# Patient Record
Sex: FEMALE | Race: WHITE | NOT HISPANIC OR LATINO | Employment: OTHER | URBAN - METROPOLITAN AREA
[De-identification: names, ages, dates, MRNs, and addresses within clinical notes are randomized per-mention and may not be internally consistent; named-entity substitution may affect disease eponyms.]

---

## 2021-04-30 NOTE — PROGRESS NOTES
"Assessment & Plan   Problem List Items Addressed This Visit        Endocrine    Other hyperlipidemia    Relevant Medications    simvastatin (ZOCOR) 40 MG tablet       Circulatory    Essential hypertension - Primary    Relevant Medications    lisinopril (ZESTRIL) 40 MG tablet         1. Essential hypertension  Controlled, continue medications.    2. Other hyperlipidemia  Continue medications.           FUTURE APPOINTMENTS:       - Follow-up visit in 3-6 months    No follow-ups on file.    Shani Argueta MD  [facility]    Subjective     Nursing Notes:   Mariia Coreas, [facility]  5/5/2021 10:32 AM  Signed  Concha is here to establish care and recheck meds    Pre-visit Screening:  Immunizations:  up to date  Colonoscopy:  Declines  Mammogram: NA d/t age  Asthma Action Test/Plan:  NA  PHQ9:  None  GAD7:  None  Questioned patient about current smoking habits Pt. currently smokes.  Advised about smoking cessation.  Ok to leave detailed message on voice mail for today's visit only Yes, phone # [phone]           Concha De Jesus is a 75 year old female who presents to clinic today for the following health issues   HPI     Here today to followup on high blood pressure, high cholesterol. She is doing well, not due now for labs or medication refills. Establishing care.    Review of Systems   Constitutional, HEENT, cardiovascular, pulmonary, gi and gu systems are negative, except as otherwise noted.      Objective    /68 (BP Location: Right arm, Patient Position: Sitting, Cuff Size: Adult Large)   Pulse 63   Temp 98.4  F (36.9  C) (Oral)   Ht 1.486 m (4' 10.5\")   Wt 69.8 kg (153 lb 12.8 oz)   LMP  (LMP Unknown)   SpO2 98%   BMI 31.60 kg/m    Body mass index is 31.6 kg/m .  Physical Exam   GENERAL: healthy, alert and no distress  MS: no gross musculoskeletal defects noted, no edema  NEURO: Normal strength and tone, mentation intact and speech normal  PSYCH: mentation appears normal, affect " normal/bright

## 2021-05-05 ENCOUNTER — OFFICE VISIT (OUTPATIENT)
Dept: FAMILY MEDICINE | Facility: CLINIC | Age: 76
End: 2021-05-05

## 2021-05-05 VITALS
WEIGHT: 153.8 LBS | TEMPERATURE: 98.4 F | SYSTOLIC BLOOD PRESSURE: 134 MMHG | HEIGHT: 59 IN | OXYGEN SATURATION: 98 % | HEART RATE: 63 BPM | BODY MASS INDEX: 31 KG/M2 | DIASTOLIC BLOOD PRESSURE: 68 MMHG

## 2021-05-05 DIAGNOSIS — I10 ESSENTIAL HYPERTENSION: Primary | ICD-10-CM

## 2021-05-05 DIAGNOSIS — E78.49 OTHER HYPERLIPIDEMIA: ICD-10-CM

## 2021-05-05 PROBLEM — Z76.89 HEALTH CARE HOME: Status: ACTIVE | Noted: 2021-05-05

## 2021-05-05 PROBLEM — Z71.89 ACP (ADVANCE CARE PLANNING): Status: ACTIVE | Noted: 2021-05-05

## 2021-05-05 PROCEDURE — 99203 OFFICE O/P NEW LOW 30 MIN: CPT | Performed by: FAMILY MEDICINE

## 2021-05-05 RX ORDER — KETOCONAZOLE 20 MG/ML
SHAMPOO TOPICAL
COMMUNITY
Start: 2020-10-26 | End: 2023-02-15

## 2021-05-05 RX ORDER — LISINOPRIL 40 MG/1
TABLET ORAL
COMMUNITY
Start: 2020-10-26 | End: 2021-10-05

## 2021-05-05 RX ORDER — ACETAMINOPHEN 160 MG
TABLET,DISINTEGRATING ORAL
COMMUNITY

## 2021-05-05 RX ORDER — SIMVASTATIN 40 MG
TABLET ORAL
COMMUNITY
Start: 2020-10-26 | End: 2021-10-05

## 2021-05-05 RX ORDER — ASPIRIN 81 MG/1
81 TABLET ORAL DAILY
COMMUNITY

## 2021-05-05 SDOH — HEALTH STABILITY: MENTAL HEALTH: HOW OFTEN DO YOU HAVE A DRINK CONTAINING ALCOHOL?: NOT ASKED

## 2021-05-05 SDOH — HEALTH STABILITY: MENTAL HEALTH: HOW OFTEN DO YOU HAVE 6 OR MORE DRINKS ON ONE OCCASION?: NOT ASKED

## 2021-05-05 SDOH — HEALTH STABILITY: MENTAL HEALTH: HOW MANY STANDARD DRINKS CONTAINING ALCOHOL DO YOU HAVE ON A TYPICAL DAY?: NOT ASKED

## 2021-05-05 ASSESSMENT — MIFFLIN-ST. JEOR: SCORE: 1095.32

## 2021-05-05 NOTE — NURSING NOTE
Concha is here to establish care and recheck meds    Pre-visit Screening:  Immunizations:  up to date  Colonoscopy:  Declines  Mammogram: NA d/t age  Asthma Action Test/Plan:  NA  PHQ9:  None  GAD7:  None  Questioned patient about current smoking habits Pt. currently smokes.  Advised about smoking cessation.  Ok to leave detailed message on voice mail for today's visit only Yes, phone # [phone]

## 2021-05-25 ENCOUNTER — OFFICE VISIT (OUTPATIENT)
Dept: FAMILY MEDICINE | Facility: CLINIC | Age: 76
End: 2021-05-25

## 2021-05-25 VITALS
TEMPERATURE: 98.2 F | WEIGHT: 153 LBS | HEART RATE: 80 BPM | SYSTOLIC BLOOD PRESSURE: 136 MMHG | DIASTOLIC BLOOD PRESSURE: 70 MMHG | OXYGEN SATURATION: 97 % | BODY MASS INDEX: 31.43 KG/M2

## 2021-05-25 DIAGNOSIS — Z85.820 HISTORY OF MELANOMA: ICD-10-CM

## 2021-05-25 DIAGNOSIS — M25.562 LEFT KNEE PAIN, UNSPECIFIED CHRONICITY: Primary | ICD-10-CM

## 2021-05-25 PROCEDURE — 99213 OFFICE O/P EST LOW 20 MIN: CPT | Performed by: FAMILY MEDICINE

## 2021-05-25 NOTE — NURSING NOTE
Concha Pt wants MRI, left knee, swollen left ankle too, started to get really bad last week, originally started 3 months ago.    Pre-Visit Screening:  Immunizations:UTD  Colonoscopy:NA  Mammogram:declined  Asthma Action Test/Plan:NA  PHQ9:Na  GAD7:Na  Questioned patient about current smoking habits Pt.current smoker  OK to leave a detailed message on voice mail for today's visit yes, phone # [phone]

## 2021-05-25 NOTE — PROGRESS NOTES
"Assessment & Plan   Problem List Items Addressed This Visit        Other    History of melanoma    Relevant Orders    MR Knee Left w/o Contrast    RADIOLOGY REFERRAL      Other Visit Diagnoses     Left knee pain, unspecified chronicity    -  Primary    Relevant Orders    MR Knee Left w/o Contrast    RADIOLOGY REFERRAL         1. Left knee pain, unspecified chronicity  MRI knee then probable referral back to see Dr. Chung.  - MR Knee Left w/o Contrast  - RADIOLOGY REFERRAL    2. History of melanoma  Left lower leg  - MR Knee Left w/o Contrast  - RADIOLOGY REFERRAL           Tobacco Cessation:   reports that she has been smoking. She has never used smokeless tobacco.  Tobacco Cessation Action Plan: Information offered: Patient not interested at this time    BMI:   Estimated body mass index is 31.43 kg/m  as calculated from the following:    Height as of 5/5/21: 1.486 m (4' 10.5\").    Weight as of this encounter: 69.4 kg (153 lb).         FUTURE APPOINTMENTS:       - Follow-up visit per results.    No follow-ups on file.    Sahni Argueta MD  [facility]    Subjective     Nursing Notes:   Amy Callaway CMA  5/25/2021  3:02 PM  Signed  Concha Pt wants MRI, left knee, swollen left ankle too, started to get really bad last week, originally started 3 months ago.    Pre-Visit Screening:  Immunizations:UTD  Colonoscopy:NA  Mammogram:declined  Asthma Action Test/Plan:NA  PHQ9:Na  GAD7:Na  Questioned patient about current smoking habits Pt.current smoker  OK to leave a detailed message on voice mail for today's visit yes, phone # [phone]            Concha De Jesus is a 75 year old female who presents to clinic today for the following health issues   HPI     Left ankle swelling.  Has had trouble with the left knee for 2-3 months, has seen Dr. Chung and he did an xray, arthritis. And he did an injection and this lasted about a week. Also has swelling in the knee and it's tight/darlin horses, "   Follows with Dr. Fernandez dermatology for melanoma, goes in every 6 months. This was also in the posterior left knee.  Review of Systems   Constitutional, HEENT, cardiovascular, pulmonary, gi and gu systems are negative, except as otherwise noted.      Objective    /70 (BP Location: Right arm, Patient Position: Sitting, Cuff Size: Adult Large)   Pulse 80   Temp 98.2  F (36.8  C)   Wt 69.4 kg (153 lb)   LMP  (LMP Unknown)   SpO2 97%   BMI 31.43 kg/m    Body mass index is 31.43 kg/m .  Physical Exam   GENERAL: healthy, alert and no distress  MS: no gross musculoskeletal defects noted, no edema  NEURO: Normal strength and tone, mentation intact and speech normal  PSYCH: mentation appears normal, affect normal/bright  Left knee stable to varus/valgus stress, neg lachmans. Slightly bigger than right. But no obvious effusion. Normal rom

## 2021-05-25 NOTE — PATIENT INSTRUCTIONS
"Assessment & Plan   Problem List Items Addressed This Visit        Other    History of melanoma    Relevant Orders    MR Knee Left w/o Contrast    RADIOLOGY REFERRAL      Other Visit Diagnoses     Left knee pain, unspecified chronicity    -  Primary    Relevant Orders    MR Knee Left w/o Contrast    RADIOLOGY REFERRAL         1. Left knee pain, unspecified chronicity  MRI knee then probable referral back to see Dr. Chung.  - MR Knee Left w/o Contrast  - RADIOLOGY REFERRAL    2. History of melanoma  Left lower leg  - MR Knee Left w/o Contrast  - RADIOLOGY REFERRAL           Tobacco Cessation:   reports that she has been smoking. She has never used smokeless tobacco.  Tobacco Cessation Action Plan: Information offered: Patient not interested at this time    BMI:   Estimated body mass index is 31.43 kg/m  as calculated from the following:    Height as of 5/5/21: 1.486 m (4' 10.5\").    Weight as of this encounter: 69.4 kg (153 lb).         FUTURE APPOINTMENTS:       - Follow-up visit per results.    No follow-ups on file.    Shani Argueta MD  [facility]  "

## 2021-06-06 ENCOUNTER — HEALTH MAINTENANCE LETTER (OUTPATIENT)
Age: 76
End: 2021-06-06

## 2021-06-17 ENCOUNTER — TRANSFERRED RECORDS (OUTPATIENT)
Dept: FAMILY MEDICINE | Facility: CLINIC | Age: 76
End: 2021-06-17

## 2021-09-15 ENCOUNTER — OFFICE VISIT (OUTPATIENT)
Dept: FAMILY MEDICINE | Facility: CLINIC | Age: 76
End: 2021-09-15

## 2021-09-15 ENCOUNTER — MEDICAL CORRESPONDENCE (OUTPATIENT)
Dept: HEALTH INFORMATION MANAGEMENT | Facility: CLINIC | Age: 76
End: 2021-09-15

## 2021-09-15 ENCOUNTER — LAB (OUTPATIENT)
Dept: LAB | Facility: CLINIC | Age: 76
End: 2021-09-15
Payer: COMMERCIAL

## 2021-09-15 VITALS
SYSTOLIC BLOOD PRESSURE: 138 MMHG | HEART RATE: 63 BPM | OXYGEN SATURATION: 96 % | TEMPERATURE: 98.5 F | WEIGHT: 151.4 LBS | BODY MASS INDEX: 30.52 KG/M2 | DIASTOLIC BLOOD PRESSURE: 82 MMHG | HEIGHT: 59 IN

## 2021-09-15 DIAGNOSIS — R10.9 FLANK PAIN: Primary | ICD-10-CM

## 2021-09-15 DIAGNOSIS — N20.0 KIDNEY STONE: ICD-10-CM

## 2021-09-15 DIAGNOSIS — R01.1 HEART MURMUR: ICD-10-CM

## 2021-09-15 DIAGNOSIS — R79.89 ELEVATED D-DIMER: ICD-10-CM

## 2021-09-15 DIAGNOSIS — I25.10 CORONARY ARTERY DISEASE INVOLVING NATIVE CORONARY ARTERY OF NATIVE HEART WITHOUT ANGINA PECTORIS: ICD-10-CM

## 2021-09-15 LAB
APPEARANCE UR: CLEAR
BACTERIA, UR: ABNORMAL
BILIRUB UR QL: ABNORMAL
CASTS/LPF: ABNORMAL
COLOR UR: YELLOW
D DIMER PPP FEU-MCNC: 0.75 UG/ML FEU (ref 0–0.5)
EP/HPF: ABNORMAL
GLUCOSE URINE: ABNORMAL MG/DL
HGB UR QL: ABNORMAL
KETONES UR QL: ABNORMAL MG/DL
MISC.: ABNORMAL
NITRITE UR QL STRIP: ABNORMAL
PH UR STRIP: 6 PH (ref 5–7)
PROT UR QL: ABNORMAL MG/DL
RBC, UR MICRO: ABNORMAL (ref ?–2)
SP GR UR STRIP: 1.01 (ref 1–1.03)
UROBILINOGEN UR QL STRIP: 0.2 EU/DL (ref 0.2–1)
WBC #/AREA URNS HPF: ABNORMAL /[HPF]
WBC, UR MICRO: ABNORMAL (ref ?–2)

## 2021-09-15 PROCEDURE — 81001 URINALYSIS AUTO W/SCOPE: CPT | Performed by: FAMILY MEDICINE

## 2021-09-15 PROCEDURE — 36415 COLL VENOUS BLD VENIPUNCTURE: CPT

## 2021-09-15 PROCEDURE — 85379 FIBRIN DEGRADATION QUANT: CPT

## 2021-09-15 PROCEDURE — 71046 X-RAY EXAM CHEST 2 VIEWS: CPT | Performed by: FAMILY MEDICINE

## 2021-09-15 PROCEDURE — 99214 OFFICE O/P EST MOD 30 MIN: CPT | Performed by: FAMILY MEDICINE

## 2021-09-15 RX ORDER — TIZANIDINE 2 MG/1
2 TABLET ORAL 3 TIMES DAILY
Qty: 20 TABLET | Refills: 0 | Status: SHIPPED | OUTPATIENT
Start: 2021-09-15 | End: 2021-10-05

## 2021-09-15 ASSESSMENT — MIFFLIN-ST. JEOR: SCORE: 1079.44

## 2021-09-15 NOTE — PATIENT INSTRUCTIONS
Assessment & Plan   Problem List Items Addressed This Visit     None      Visit Diagnoses     Flank pain    -  Primary    Relevant Orders    URINALYSIS, ROUTINE ([facility]) (Completed)    URINE CULTURE AEROBIC BACTERIAL ([facility])    XR Chest 2 Views    Heart murmur        Relevant Orders    Echocardiogram Complete    Radiology Referral        1. Flank pain  Urine negative, chest xray done. I think this is more musculoskeletal.  Treat symptoms and followup if persistent. Will also do d dimer.  - URINALYSIS, ROUTINE ([facility])  - URINE CULTURE AEROBIC BACTERIAL ([facility])  - XR Chest 2 Views    2. Heart murmur  Referral done for echocardiogram.  - Echocardiogram Complete; Future  - Radiology Referral; Future                FUTURE APPOINTMENTS:       - Follow-up visit per results.    No follow-ups on file.    Shani Argueta MD  [facility]

## 2021-09-15 NOTE — NURSING NOTE
Chief Complaint   Patient presents with     Back Pain     back pain on right side mid- back started over the weekend, Monday pain had increased, steady pain, had this feeling last time she had pneumonia     Pre-visit Screening:  Immunizations:  up to date  Colonoscopy:  is up to date  Mammogram: is up to date  Asthma Action Test/Plan:  NA  PHQ9:  NA  GAD7:  NA  Questioned patient about current smoking habits Pt. smokes 1 pack of cigerettes a day  Ok to leave detailed message on voice mail for today's visit only Yes, phone # [phone]

## 2021-09-15 NOTE — PROGRESS NOTES
Assessment & Plan   Problem List Items Addressed This Visit        Circulatory    Coronary artery disease involving native coronary artery of native heart without angina pectoris    Relevant Orders    Adult Cardiology Eval Referral       Urinary    Kidney stone      Other Visit Diagnoses     Flank pain    -  Primary    Relevant Medications    tiZANidine (ZANAFLEX) 2 MG tablet    Other Relevant Orders    URINALYSIS, ROUTINE ([facility]) (Completed)    URINE CULTURE AEROBIC BACTERIAL ([facility])    XR Chest 2 Views (Completed)    CT Chest w Contrast (Completed)    Heart murmur        Relevant Orders    Echocardiogram Complete    Radiology Referral    Adult Cardiology Eval Referral    Elevated d-dimer        Relevant Orders    CT Chest w Contrast (Completed)    Radiology Referral (Completed)        1. Flank pain  Urine negative, chest xray done. I think this is more musculoskeletal.  Treat symptoms and followup if persistent. Will also do d dimer. Tizanidine as needed.  - URINALYSIS, ROUTINE ([facility])  - URINE CULTURE AEROBIC BACTERIAL ([facility])  - XR Chest 2 Views    2. Heart murmur  Referral done for echocardiogram.  - Echocardiogram Complete; Future  - Radiology Referral; Future       3. Elevated d-dimer  Negative CT scan.   - CT Chest w Contrast; Future  - Radiology Referral; Future  - Radiology Referral  - CT Chest w Contrast    4. Kidney stone  Referral to urology. However, I think this is less likely to cause her pain. However, I offered possibly doing renal ultrasound, suggested as a possibility by the radiologist. She declined for now.    5. Coronary artery disease involving native coronary artery of native heart without angina pectoris  Referral to cardiology.  - Adult Cardiology Eval Referral; Future           FUTURE APPOINTMENTS:       - Follow-up visit per results.    No follow-ups on file.    Shani Argueta MD  [facility]    Subjective     Nursing Notes:   Lo Munguia CMA  9/15/2021 10:33 AM   "Signed  Chief Complaint   Patient presents with     Back Pain     back pain on right side mid- back started over the weekend, Monday pain had increased, steady pain, had this feeling last time she had pneumonia     Pre-visit Screening:  Immunizations:  up to date  Colonoscopy:  is up to date  Mammogram: is up to date  Asthma Action Test/Plan:  NA  PHQ9:  NA  GAD7:  NA  Questioned patient about current smoking habits Pt. smokes 1 pack of cigerettes a day  Ok to leave detailed message on voice mail for today's visit only Yes, phone # [phone]           Concha De Jesus is a 76 year old female who presents to clinic today for the following health issues   HPI     Right flank pain, started Saturday, no fever and no shortness of breath. Is affecting golf, so then it's concerning. Thinks she has pneumonia again. Just in the one spot like last time. No urinary sx. No fevers. Certain movements bring it on, is almost constant.   No covid sx.    Review of Systems   Constitutional, HEENT, cardiovascular, pulmonary, gi and gu systems are negative, except as otherwise noted.      Objective    /82 (BP Location: Left arm, Patient Position: Sitting, Cuff Size: Adult Regular)   Pulse 63   Temp 98.5  F (36.9  C) (Temporal)   Ht 1.486 m (4' 10.5\")   Wt 68.7 kg (151 lb 6.4 oz)   LMP  (LMP Unknown)   SpO2 96%   BMI 31.10 kg/m    Body mass index is 31.1 kg/m .  Physical Exam   GENERAL: healthy, alert and no distress  RESP: lungs clear to auscultation - no rales, rhonchi or wheezes  CV: regular rate and rhythm, normal S1 S2, no S3 or S4, + murmur, click or rub, no peripheral edema and peripheral pulses strong  ABDOMEN: soft, nontender, no hepatosplenomegaly, no masses and bowel sounds normal  MS: no gross musculoskeletal defects noted, no edema  NEURO: Normal strength and tone, mentation intact and speech normal  PSYCH: mentation appears normal, affect normal/bright  No flank tenderness to palpation, normal back rom, she " can elicit the pain with certain twisting movement.    Results for orders placed or performed in visit on 09/15/21   D dimer quantitative     Status: Abnormal   Result Value Ref Range    D-Dimer Quantitative 0.75 (H) 0.00 - 0.50 ug/mL FEU    Narrative    This D-dimer assay is intended for use in conjunction with a clinical pretest probability assessment model to exclude pulmonary embolism (PE) and deep venous thrombosis (DVT) in outpatients suspected of PE or DVT. The cut-off value is 0.50 ug/mL FEU.   Results for orders placed or performed in visit on 09/15/21   XR Chest 2 Views     Status: None    Narrative    [facility]  Phone: [phone] * Fax: [phone] [address], [place] [address]  Age: 76 Y  Dept No.: [id number]  DAYO AGUILAR : 1945  Chart #  Gender: F  Req. Phys: Shani Argueta MD Clinic MRN:  Clinic: [facility] Acc#: [id number]  Exam: CHEST 2 VIEWS PA AND LATERAL Exam Date: 09/15/2021  EXAM: CHEST 2 VIEWS PA AND LATERAL  LOCATION: [facility]  DATE/TIME: 9/15/2021 1:46 PM  INDICATION: Flank pain.  COMPARISON: Chest x-ray 2016.  IMPRESSION: Negative chest.  Performed by: TIANA  Transcribed By: FELI on: 09/15/2021 2:07 PM CDT  Finalized By: ZOHRA PATLE M.D. on: 09/15/2021 3:09 PM CDT  Dictated By: ZOHRA PATEL M.D. Signed by: OTILIO  Page 1 of 1   CT Chest w Contrast     Status: None    Narrative    [address], [address]  [place] [address]  Phone: [phone]  [place]  : 1945 Req Phys: Shani Argueta MD  Clinic: [place] Patient name: DAYO AGUILAR [facility]  Dept No: [id number]  *CT CHEST w CONTRAST Exam Date: 09/15/2021 Accession: [id number]  EXAM: CT CHEST w CONTRAST  LOCATION: [facility] [place]  DATE/TIME: 9/15/2021 2:09 PM  INDICATION: Flank pain. Elevated D  Dimer.  COMPARISON: Outside chest CTA on 06/10/2019.  TECHNIQUE: CT chest with IV contrast. Multiplanar reformats were obtained. Dose reduction techniques were used.  CONTRAST: 80 mL Omnipaque 350.  FINDINGS:  LUNGS AND PLEURA: No pleural effusion or pneumothorax. Bibasilar pulmonary opacities, likely atelectasis.  MEDIASTINUM/AXILLAE: No evidence of pulmonary embolism. Mild cardiomegaly. No significant pericardial  effusion. No significant mediastinal, hilar or axillary lymphadenopathy. Small cystic area in the anterior aspect of the  aortopulmonary window (series 4 image 48), not significantly changed as compared to 06/10/2019 exam, indeterminate,  could represent bronchial or esophageal duplication cyst.  CORONARY ARTERY CALCIFICATION: Moderate.  UPPER ABDOMEN: Limited evaluation of the upper abdomen due to lack of coverage. 4 cm left upper pole renal cyst.  Few left kidney stones measure up to 6 mm at the upper pole. 9 mm left adrenal nodule (series 4 image 131).  MUSCULOSKELETAL: Multilevel degenerative changes of the spine. No suspicious osseous lesion.  IMPRESSION:  1. No evidence of pulmonary embolism.  2. Moderate atherosclerotic vascular calcification of the coronary arteries and proximal abdominal aorta.  3. Few left kidney stones measure up to 9 mm.  4. A 9 mm left adrenal nodule, not significantly changed as compared to 06/10/2019 exam, although technically  indeterminate, likely benign adenoma given its interval stability.  Dictated By: DANA PRESTON M.D.  Password protected electronic signature by: BESSIE  Trans: LUIS  Date Of Trans: 9/15/2021 2:33:00PM  Date report approved and signed by interpreting physician: 9/15/2021 2:49:00PM  Page 1 of 1   URINALYSIS, ROUTINE ([facility])     Status: Abnormal   Result Value Ref Range    Color Urine Yellow     Appearance Urine Clear     Glucose Urine Neg neg mg/dL    Bilirubin Urine Neg neg    Ketones Urine Neg neg mg/dL    Specific Gravity Urine 1.010 1.003 - 1.035     Blood Urine Neg neg    pH Urine 6.0 5.0 - 7.0 pH    Protein Urine neg neg - neg mg/dL    Urobilinogen Urine 0.2 0.2 - 1.0 EU/dL    Nitrite Urine Neg NEG    Leukocytes neg     Wbc, Urine Micro 0-2 neg - 2    RBC Micro Urine 0-2 neg - 2    EP/HPF FEW     Bacteria Urine FEW (A) neg - neg    Casts/LPF neg     Miscellaneous neg        CXR negative, personally read by me, sent for overread   Statement Selected

## 2021-09-17 LAB — URINE VOIDED CULTURE: NORMAL

## 2021-09-26 ENCOUNTER — HEALTH MAINTENANCE LETTER (OUTPATIENT)
Age: 76
End: 2021-09-26

## 2021-09-29 DIAGNOSIS — R01.1 HEART MURMUR: ICD-10-CM

## 2021-10-01 ENCOUNTER — TRANSFERRED RECORDS (OUTPATIENT)
Dept: FAMILY MEDICINE | Facility: CLINIC | Age: 76
End: 2021-10-01

## 2021-10-05 ENCOUNTER — OFFICE VISIT (OUTPATIENT)
Dept: FAMILY MEDICINE | Facility: CLINIC | Age: 76
End: 2021-10-05

## 2021-10-05 VITALS
WEIGHT: 149 LBS | TEMPERATURE: 97.5 F | DIASTOLIC BLOOD PRESSURE: 78 MMHG | BODY MASS INDEX: 30.04 KG/M2 | OXYGEN SATURATION: 97 % | HEART RATE: 57 BPM | HEIGHT: 59 IN | SYSTOLIC BLOOD PRESSURE: 126 MMHG

## 2021-10-05 DIAGNOSIS — Z82.49 FAMILY HISTORY OF ISCHEMIC HEART DISEASE: ICD-10-CM

## 2021-10-05 DIAGNOSIS — Z11.59 NEED FOR HEPATITIS C SCREENING TEST: ICD-10-CM

## 2021-10-05 DIAGNOSIS — R01.1 HEART MURMUR: ICD-10-CM

## 2021-10-05 DIAGNOSIS — I10 ESSENTIAL HYPERTENSION: ICD-10-CM

## 2021-10-05 DIAGNOSIS — E78.49 OTHER HYPERLIPIDEMIA: Primary | ICD-10-CM

## 2021-10-05 LAB
ALBUMIN SERPL-MCNC: 4.3 G/DL (ref 3.6–5.1)
ALBUMIN/GLOB SERPL: 2 {RATIO} (ref 1–2.5)
ALP SERPL-CCNC: 62 U/L (ref 33–130)
ALT 1742-6: 8 U/L (ref 0–32)
AST 1920-8: 14 U/L (ref 0–35)
BILIRUB SERPL-MCNC: 0.6 MG/DL (ref 0.2–1.2)
BUN SERPL-MCNC: 11 MG/DL (ref 7–25)
BUN/CREATININE RATIO: 10.6 (ref 6–22)
CALCIUM SERPL-MCNC: 9.9 MG/DL (ref 8.6–10.3)
CHLORIDE SERPLBLD-SCNC: 105.8 MMOL/L (ref 98–110)
CHOLEST SERPL-MCNC: 135 MG/DL (ref 0–199)
CHOLEST/HDLC SERPL: 3 {RATIO} (ref 0–5)
CO2 SERPL-SCNC: 29.3 MMOL/L (ref 20–32)
CREAT SERPL-MCNC: 1.04 MG/DL (ref 0.6–1.3)
GLOBULIN, CALCULATED - QUEST: 2.2 (ref 1.9–3.7)
GLUCOSE SERPL-MCNC: 107 MG/DL (ref 60–99)
HDLC SERPL-MCNC: 44 MG/DL (ref 40–150)
LDLC SERPL CALC-MCNC: 63 MG/DL (ref 0–130)
POTASSIUM SERPL-SCNC: 4.36 MMOL/L (ref 3.5–5.3)
PROT SERPL-MCNC: 6.5 G/DL (ref 6.1–8.1)
SODIUM SERPL-SCNC: 141.7 MMOL/L (ref 135–146)
TRIGL SERPL-MCNC: 142 MG/DL (ref 0–149)

## 2021-10-05 PROCEDURE — 36415 COLL VENOUS BLD VENIPUNCTURE: CPT | Performed by: FAMILY MEDICINE

## 2021-10-05 PROCEDURE — 80061 LIPID PANEL: CPT | Performed by: FAMILY MEDICINE

## 2021-10-05 PROCEDURE — 99213 OFFICE O/P EST LOW 20 MIN: CPT | Performed by: FAMILY MEDICINE

## 2021-10-05 PROCEDURE — 80053 COMPREHEN METABOLIC PANEL: CPT | Performed by: FAMILY MEDICINE

## 2021-10-05 PROCEDURE — 86803 HEPATITIS C AB TEST: CPT | Mod: 90 | Performed by: FAMILY MEDICINE

## 2021-10-05 RX ORDER — LISINOPRIL 40 MG/1
TABLET ORAL
Qty: 90 TABLET | Refills: 3 | Status: SHIPPED | OUTPATIENT
Start: 2021-10-05 | End: 2022-10-04

## 2021-10-05 RX ORDER — SIMVASTATIN 40 MG
TABLET ORAL
Qty: 90 TABLET | Refills: 3 | Status: SHIPPED | OUTPATIENT
Start: 2021-10-05 | End: 2022-10-04

## 2021-10-05 ASSESSMENT — MIFFLIN-ST. JEOR: SCORE: 1068.55

## 2021-10-05 NOTE — NURSING NOTE
Chief Complaint   Patient presents with     Recheck Medication     fasting today, refill medications      Pre-visit Screening:  Immunizations:  not up to date - flu at pharmacy  Colonoscopy:  is due and to be scheduled by patient for later completion  Mammogram: is due and to be scheduled by patient for later completion  Asthma Action Test/Plan:  NA  PHQ9:  NA  GAD7:  NA  Questioned patient about current smoking habits Pt. smokes 1 curly of cigerettes a day  Ok to leave detailed message on voice mail for today's visit only Yes, phone # [phone]

## 2021-10-05 NOTE — PROGRESS NOTES
Assessment & Plan   Problem List Items Addressed This Visit        Endocrine    Other hyperlipidemia - Primary    Relevant Medications    simvastatin (ZOCOR) 40 MG tablet    Other Relevant Orders    Lipid Panel ([facility])    Comprehensive Metobolic Panel ([facility])    VENOUS COLLECTION (Completed)       Circulatory    Essential hypertension    Relevant Medications    lisinopril (ZESTRIL) 40 MG tablet    Heart murmur       Other    Family history of ischemic heart disease      Other Visit Diagnoses     Need for hepatitis C screening test        Relevant Orders    HEPATITIS C ANTIBODY ([facility])           1. Other hyperlipidemia  Check labs, continue medications.  - simvastatin (ZOCOR) 40 MG tablet; TAKE 1 TABLET BY MOUTH EVERY EVENING  Dispense: 90 tablet; Refill: 3  - Lipid Panel ([facility])  - Comprehensive Metobolic Panel ([facility])  - VENOUS COLLECTION    2. Essential hypertension  Controlled, check labs, refilled medications.  - lisinopril (ZESTRIL) 40 MG tablet; TAKE 1 TABLET BY MOUTH EVERY DAY  Dispense: 90 tablet; Refill: 3    3. Family history of ischemic heart disease  Cardiology has recommended a lipoprotein A blood test. She will wait on this for now and discuss with cardiology.    4. Need for hepatitis C screening test    - HEPATITIS C ANTIBODY ([facility])             FUTURE APPOINTMENTS:       - Follow-up visit in 6 months    No follow-ups on file.    Shani Argueta MD  [facility]    Subjective     Nursing Notes:   Lo Munguia CMA  10/5/2021  8:35 AM  Signed  Chief Complaint   Patient presents with     Recheck Medication     fasting today, refill medications      Pre-visit Screening:  Immunizations:  not up to date - flu at pharmacy  Colonoscopy:  is due and to be scheduled by patient for later completion  Mammogram: is due and to be scheduled by patient for later completion  Asthma Action Test/Plan:  NA  PHQ9:  NA  GAD7:  NA  Questioned patient about current smoking habits Pt. smokes 1 curly of cigerettes  "a day  Ok to leave detailed message on voice mail for today's visit only Yes, phone # [phone]           Concha De Jesus is a 76 year old female who presents to clinic today for the following health issues   HPI     Here to followup on her lipids and blood pressures. Is doing well on the medications.  Has been seeing cardiology, had an echo. This was ok. Has a murmur. Was advised to get lipoprotein A lab test done.   Otherwise doing well.    Review of Systems   Constitutional, HEENT, cardiovascular, pulmonary, gi and gu systems are negative, except as otherwise noted.      Objective    /78 (BP Location: Left arm, Patient Position: Sitting, Cuff Size: Adult Regular)   Pulse 57   Temp 97.5  F (36.4  C) (Temporal)   Ht 1.486 m (4' 10.5\")   Wt 67.6 kg (149 lb)   LMP  (LMP Unknown)   SpO2 97%   BMI 30.61 kg/m    Body mass index is 30.61 kg/m .  Physical Exam   GENERAL: healthy, alert and no distress  RESP: lungs clear to auscultation - no rales, rhonchi or wheezes  CV: regular rate and rhythm, normal S1 S2, no S3 or S4, + systolic murmur, click or rub, no peripheral edema and peripheral pulses strong  MS: no gross musculoskeletal defects noted, no edema  NEURO: Normal strength and tone, mentation intact and speech normal  PSYCH: mentation appears normal, affect normal/bright    No results found for any visits on 10/05/21.      "

## 2021-10-05 NOTE — PATIENT INSTRUCTIONS
Assessment & Plan   Problem List Items Addressed This Visit        Endocrine    Other hyperlipidemia - Primary    Relevant Medications    simvastatin (ZOCOR) 40 MG tablet    Other Relevant Orders    Lipid Panel ([facility])    Comprehensive Metobolic Panel ([facility])    VENOUS COLLECTION (Completed)       Circulatory    Essential hypertension    Relevant Medications    lisinopril (ZESTRIL) 40 MG tablet       Other    Family history of ischemic heart disease      Other Visit Diagnoses     Need for hepatitis C screening test        Relevant Orders    HEPATITIS C ANTIBODY ([facility])           1. Other hyperlipidemia  Check labs, continue medications.  - simvastatin (ZOCOR) 40 MG tablet; TAKE 1 TABLET BY MOUTH EVERY EVENING  Dispense: 90 tablet; Refill: 3  - Lipid Panel ([facility])  - Comprehensive Metobolic Panel ([facility])  - VENOUS COLLECTION    2. Essential hypertension  Controlled, check labs, refilled medications.  - lisinopril (ZESTRIL) 40 MG tablet; TAKE 1 TABLET BY MOUTH EVERY DAY  Dispense: 90 tablet; Refill: 3    3. Family history of ischemic heart disease  Cardiology has recommended a lipoprotein A blood test. She will wait on this for now and discuss with cardiology.    4. Need for hepatitis C screening test    - HEPATITIS C ANTIBODY ([facility])             FUTURE APPOINTMENTS:       - Follow-up visit in 6 months    No follow-ups on file.    Shani Argueta MD  [facility]

## 2021-10-06 LAB
HCV AB - QUEST: NORMAL
SIGNAL TO CUT OFF - QUEST: 0

## 2022-01-17 ENCOUNTER — TRANSFERRED RECORDS (OUTPATIENT)
Dept: FAMILY MEDICINE | Facility: CLINIC | Age: 77
End: 2022-01-17

## 2022-05-02 ENCOUNTER — TRANSFERRED RECORDS (OUTPATIENT)
Dept: FAMILY MEDICINE | Facility: CLINIC | Age: 77
End: 2022-05-02

## 2022-07-03 ENCOUNTER — HEALTH MAINTENANCE LETTER (OUTPATIENT)
Age: 77
End: 2022-07-03

## 2022-08-22 ENCOUNTER — TRANSFERRED RECORDS (OUTPATIENT)
Dept: FAMILY MEDICINE | Facility: CLINIC | Age: 77
End: 2022-08-22

## 2022-09-26 ENCOUNTER — TRANSFERRED RECORDS (OUTPATIENT)
Dept: FAMILY MEDICINE | Facility: CLINIC | Age: 77
End: 2022-09-26

## 2022-10-04 ENCOUNTER — TELEPHONE (OUTPATIENT)
Dept: FAMILY MEDICINE | Facility: CLINIC | Age: 77
End: 2022-10-04

## 2022-10-04 ENCOUNTER — OFFICE VISIT (OUTPATIENT)
Dept: FAMILY MEDICINE | Facility: CLINIC | Age: 77
End: 2022-10-04

## 2022-10-04 VITALS
BODY MASS INDEX: 28.1 KG/M2 | OXYGEN SATURATION: 99 % | DIASTOLIC BLOOD PRESSURE: 76 MMHG | TEMPERATURE: 98 F | HEART RATE: 66 BPM | HEIGHT: 59 IN | WEIGHT: 139.4 LBS | SYSTOLIC BLOOD PRESSURE: 124 MMHG

## 2022-10-04 DIAGNOSIS — R01.1 HEART MURMUR: ICD-10-CM

## 2022-10-04 DIAGNOSIS — E78.49 OTHER HYPERLIPIDEMIA: Primary | ICD-10-CM

## 2022-10-04 DIAGNOSIS — I10 ESSENTIAL HYPERTENSION: ICD-10-CM

## 2022-10-04 DIAGNOSIS — Z87.891 PERSONAL HISTORY OF TOBACCO USE, PRESENTING HAZARDS TO HEALTH: ICD-10-CM

## 2022-10-04 DIAGNOSIS — Z78.0 POSTMENOPAUSE: ICD-10-CM

## 2022-10-04 LAB
ALBUMIN SERPL-MCNC: 4.2 G/DL (ref 3.6–5.1)
ALBUMIN/GLOB SERPL: 1.7 {RATIO} (ref 1–2.5)
ALP SERPL-CCNC: 56 U/L (ref 33–130)
ALT 1742-6: 8 U/L (ref 0–32)
AST 1920-8: 13 U/L (ref 0–35)
BILIRUB SERPL-MCNC: 0.8 MG/DL (ref 0.2–1.2)
BUN SERPL-MCNC: 11 MG/DL (ref 7–25)
BUN/CREATININE RATIO: 11 (ref 6–22)
CALCIUM SERPL-MCNC: 9.6 MG/DL (ref 8.6–10.3)
CHLORIDE SERPLBLD-SCNC: 102.9 MMOL/L (ref 98–110)
CHOLEST SERPL-MCNC: 146 MG/DL (ref 0–199)
CHOLEST/HDLC SERPL: 3 {RATIO} (ref 0–5)
CO2 SERPL-SCNC: 29.1 MMOL/L (ref 20–32)
CREAT SERPL-MCNC: 1 MG/DL (ref 0.6–1.3)
GLOBULIN, CALCULATED - QUEST: 2.4 (ref 1.9–3.7)
GLUCOSE SERPL-MCNC: 96 MG/DL (ref 60–99)
HDLC SERPL-MCNC: 53 MG/DL (ref 40–150)
LDLC SERPL CALC-MCNC: 73 MG/DL (ref 0–130)
POTASSIUM SERPL-SCNC: 4.62 MMOL/L (ref 3.5–5.3)
PROT SERPL-MCNC: 6.6 G/DL (ref 6.1–8.1)
SODIUM SERPL-SCNC: 138.4 MMOL/L (ref 135–146)
TRIGL SERPL-MCNC: 102 MG/DL (ref 0–149)

## 2022-10-04 PROCEDURE — 36415 COLL VENOUS BLD VENIPUNCTURE: CPT | Performed by: FAMILY MEDICINE

## 2022-10-04 PROCEDURE — 80061 LIPID PANEL: CPT | Performed by: FAMILY MEDICINE

## 2022-10-04 PROCEDURE — 80053 COMPREHEN METABOLIC PANEL: CPT | Performed by: FAMILY MEDICINE

## 2022-10-04 PROCEDURE — 99214 OFFICE O/P EST MOD 30 MIN: CPT | Performed by: FAMILY MEDICINE

## 2022-10-04 RX ORDER — LISINOPRIL 40 MG/1
TABLET ORAL
Qty: 90 TABLET | Refills: 3 | Status: SHIPPED | OUTPATIENT
Start: 2022-10-04 | End: 2023-10-09

## 2022-10-04 RX ORDER — SIMVASTATIN 40 MG
TABLET ORAL
Qty: 90 TABLET | Refills: 3 | Status: SHIPPED | OUTPATIENT
Start: 2022-10-04 | End: 2023-10-05

## 2022-10-04 NOTE — TELEPHONE ENCOUNTER
Patient states that cardiology was supposed to call her for further testing but she hasn't heard back from them. Can you check into this?

## 2022-10-04 NOTE — NURSING NOTE
Chief Complaint   Patient presents with     Recheck Medication     Fasting today, refill medications      Pre-visit Screening:  Immunizations:  up to date  Colonoscopy:  is up to date  Mammogram: is up to date  Asthma Action Test/Plan:  NA  PHQ9:  NA  GAD7:  NA  Questioned patient about current smoking habits Pt. smokes 1 pack of cigerettes a day  Ok to leave detailed message on voice mail for today's visit only Yes, phone # [phone]

## 2022-10-04 NOTE — PROGRESS NOTES
"Assessment & Plan   Problem List Items Addressed This Visit        Endocrine    Other hyperlipidemia - Primary    Relevant Medications    simvastatin (ZOCOR) 40 MG tablet    Other Relevant Orders    VENOUS COLLECTION (Completed)    Comprehensive Metobolic Panel (BFP)    Lipid Panel (BFP)       Circulatory    Essential hypertension    Relevant Medications    lisinopril (ZESTRIL) 40 MG tablet    Heart murmur       Behavioral    Personal history of tobacco use, presenting hazards to health      Other Visit Diagnoses     Postmenopause        Relevant Orders    Dexa hip/pelvis/spine*    Radiology Referral         1. Other hyperlipidemia  Check labs, refilled medications.  - simvastatin (ZOCOR) 40 MG tablet; TAKE 1 TABLET BY MOUTH EVERY EVENING  Dispense: 90 tablet; Refill: 3  - VENOUS COLLECTION  - Comprehensive Metobolic Panel (BFP)  - Lipid Panel (BFP)    2. Essential hypertension  Controlled on medications, refilled.  - lisinopril (ZESTRIL) 40 MG tablet; TAKE 1 TABLET BY MOUTH EVERY DAY  Dispense: 90 tablet; Refill: 3    3. Personal history of tobacco use, presenting hazards to health  Advised to quit, declined. She said she doesn't want to quit. Advised lung cancer screen, declined.    4. Postmenopause    - Dexa hip/pelvis/spine*  - Radiology Referral; Future    5. Heart murmur  Has seen Dr. Eugene. She said she was supposed to followup with her and do additional testing. Advised her to contact their office.           Nicotine/Tobacco Cessation:  She reports that she has been smoking. She has never used smokeless tobacco.  Nicotine/Tobacco Cessation Plan:   Advised to quit, declined.      BMI:   Estimated body mass index is 28.64 kg/m  as calculated from the following:    Height as of this encounter: 1.486 m (4' 10.5\").    Weight as of this encounter: 63.2 kg (139 lb 6.4 oz).         FUTURE APPOINTMENTS:       - Follow-up visit in 6-12 months.    No follow-ups on file.    Shani Argueta MD  [facility] " "[facility]YS[facility]ANS    Subjective     Nursing Notes:   Lo Munguia CMA  10/4/2022  9:38 AM  Signed  Chief Complaint   Patient presents with     Recheck Medication     Fasting today, refill medications      Pre-visit Screening:  Immunizations:  up to date  Colonoscopy:  is up to date  Mammogram: is up to date  Asthma Action Test/Plan:  NA  PHQ9:  NA  GAD7:  NA  Questioned patient about current smoking habits Pt. smokes 1 pack of cigerettes a day  Ok to leave detailed message on voice mail for today's visit only Yes, phone # [phone]           Concha De Jesus is a 77 year old female who presents to clinic today for the following health issues   HPI     Here to followup on her blood pressure--she is doing well on the medications. Was checking weekly but not checking recently.  No problems with medications.    Cholesterol--is doing well on the medications. Due for labs and refills.  She said she went to see cardiology and they told her that they were going to call her for a stress test, but they haven't called her yet.        Review of Systems   Constitutional, HEENT, cardiovascular, pulmonary, gi and gu systems are negative, except as otherwise noted.      Objective    /76 (BP Location: Left arm, Patient Position: Sitting, Cuff Size: Adult Regular)   Pulse 66   Temp 98  F (36.7  C) (Temporal)   Ht 1.486 m (4' 10.5\")   Wt 63.2 kg (139 lb 6.4 oz)   LMP  (LMP Unknown)   SpO2 99%   BMI 28.64 kg/m    Body mass index is 28.64 kg/m .  Physical Exam   GENERAL: healthy, alert and no distress  RESP: lungs clear to auscultation - no rales, rhonchi or wheezes  CV: regular rate and rhythm, normal S1 S2, no S3 or S4, no murmur, click or rub, no peripheral edema and peripheral pulses strong  ABDOMEN: soft, nontender, no hepatosplenomegaly, no masses and bowel sounds normal  MS: no gross musculoskeletal defects noted, no edema  NEURO: Normal strength and tone, mentation intact and speech normal  PSYCH: mentation appears " normal, affect normal/bright    No results found for any visits on 10/04/22.

## 2023-02-15 ENCOUNTER — OFFICE VISIT (OUTPATIENT)
Dept: FAMILY MEDICINE | Facility: CLINIC | Age: 78
End: 2023-02-15

## 2023-02-15 VITALS
HEART RATE: 57 BPM | DIASTOLIC BLOOD PRESSURE: 78 MMHG | WEIGHT: 146.6 LBS | HEIGHT: 59 IN | BODY MASS INDEX: 29.56 KG/M2 | TEMPERATURE: 97.8 F | OXYGEN SATURATION: 99 % | SYSTOLIC BLOOD PRESSURE: 122 MMHG

## 2023-02-15 DIAGNOSIS — R07.89 OTHER CHEST PAIN: ICD-10-CM

## 2023-02-15 DIAGNOSIS — M65.30 TRIGGER FINGER, ACQUIRED: ICD-10-CM

## 2023-02-15 DIAGNOSIS — Z87.891 PERSONAL HISTORY OF TOBACCO USE, PRESENTING HAZARDS TO HEALTH: ICD-10-CM

## 2023-02-15 DIAGNOSIS — Z01.818 PREOPERATIVE EXAMINATION: Primary | ICD-10-CM

## 2023-02-15 DIAGNOSIS — I10 ESSENTIAL HYPERTENSION: ICD-10-CM

## 2023-02-15 DIAGNOSIS — E78.49 OTHER HYPERLIPIDEMIA: ICD-10-CM

## 2023-02-15 DIAGNOSIS — M79.645 PAIN OF LEFT THUMB: ICD-10-CM

## 2023-02-15 LAB
% GRANULOCYTES: 62.8 %
BUN SERPL-MCNC: 12 MG/DL (ref 7–25)
BUN/CREATININE RATIO: 11.3 (ref 6–22)
CALCIUM SERPL-MCNC: 9.7 MG/DL (ref 8.6–10.3)
CHLORIDE SERPLBLD-SCNC: 100.7 MMOL/L (ref 98–110)
CO2 SERPL-SCNC: 27.9 MMOL/L (ref 20–32)
CREAT SERPL-MCNC: 1.06 MG/DL (ref 0.6–1.3)
GLUCOSE SERPL-MCNC: 94 MG/DL (ref 60–99)
HCT VFR BLD AUTO: 42.2 % (ref 35–47)
HEMOGLOBIN: 14.2 G/DL (ref 11.7–15.7)
LYMPHOCYTES NFR BLD AUTO: 30.5 %
MCH RBC QN AUTO: 32.7 PG (ref 26–33)
MCHC RBC AUTO-ENTMCNC: 33.6 G/DL (ref 31–36)
MCV RBC AUTO: 97.3 FL (ref 78–100)
MONOCYTES NFR BLD AUTO: 6.7 %
PLATELET COUNT - QUEST: 276 10^9/L (ref 150–375)
POTASSIUM SERPL-SCNC: 4.28 MMOL/L (ref 3.5–5.3)
RBC # BLD AUTO: 4.34 10*12/L (ref 3.8–5.2)
SODIUM SERPL-SCNC: 137.4 MMOL/L (ref 135–146)
WBC # BLD AUTO: 9.2 10*9/L (ref 4–11)

## 2023-02-15 PROCEDURE — 85025 COMPLETE CBC W/AUTO DIFF WBC: CPT | Performed by: FAMILY MEDICINE

## 2023-02-15 PROCEDURE — 80048 BASIC METABOLIC PNL TOTAL CA: CPT | Performed by: FAMILY MEDICINE

## 2023-02-15 PROCEDURE — 36415 COLL VENOUS BLD VENIPUNCTURE: CPT | Performed by: FAMILY MEDICINE

## 2023-02-15 PROCEDURE — 93000 ELECTROCARDIOGRAM COMPLETE: CPT | Performed by: FAMILY MEDICINE

## 2023-02-15 PROCEDURE — 99215 OFFICE O/P EST HI 40 MIN: CPT | Mod: 25 | Performed by: FAMILY MEDICINE

## 2023-02-15 NOTE — NURSING NOTE
Chief Complaint   Patient presents with     Pre-Op Exam     Surgery/Procedure: Left hand trigger finger release and thumb repair   Surgery Location: [facility]   Surgeon: Dr. Perry   Surgery Date: 02/27/23

## 2023-02-15 NOTE — PROGRESS NOTES
[facility]  [address]  [address]  [facility] [address]  Phone: [phone]  Fax: [phone]  Primary Provider: Shani Argueta  Pre-op Performing Provider: SHANI ARGUETA      PREOPERATIVE EVALUATION:  Today's date: 2/15/2023    Concha De Jesus is a 77 year old female who presents for a preoperative evaluation. ( 46)    Surgical Information:  Surgery/Procedure: Left hand trigger finger release and thumb repair   Surgery Location: [facility]   Surgeon: Dr. Perry   Surgery Date: 23  Time of Surgery: AM  Where patient plans to recover: At home with family  Fax number for surgical facility: [phone]    Type of Anesthesia Anticipated: to be determined    Assessment & Plan     The proposed surgical procedure is considered LOW risk.    Problem List Items Addressed This Visit        Nervous and Auditory    Other chest pain       Endocrine    Other hyperlipidemia       Circulatory    Essential hypertension       Behavioral    Personal history of tobacco use, presenting hazards to health   Other Visit Diagnoses     Preoperative examination    -  Primary    Relevant Orders    EKG 12-lead complete w/read - Clinics (Completed)    VENOUS COLLECTION (Completed)    Basic Metabolic Panel (BFP) (Completed)    HEMOGRAM PLATELET DIFF (BFP) (Completed)    Trigger finger, acquired        Pain of left thumb            1. Preoperative examination    - EKG 12-lead complete w/read - Clinics  - VENOUS COLLECTION  - Basic Metabolic Panel (BFP)  - HEMOGRAM PLATELET DIFF (BFP)    2. Trigger finger, acquired      3. Pain of left thumb      4. Personal history of tobacco use, presenting hazards to health  Advised to quit.    5. Essential hypertension  Controlled.    6. Other hyperlipidemia      7. Other chest pain  Stress test done, no wall motion abnormalities.      Risks and Recommendations:  The patient has the following additional risks and recommendations for  perioperative complications:   - No identified additional risk factors other than previously addressed    Medication Instructions:  Patient is to take all scheduled medications on the day of surgery EXCEPT for modifications listed below:  hold aspirin prior    RECOMMENDATION:  APPROVAL GIVEN to proceed with proposed procedure, without further diagnostic evaluation.      Subjective     HPI related to upcoming procedure:     Here for preop for left 4th trigger finger and left hand in the thumb, will have something released. Thumb is painful and finger gets stuck. Bothering her for at least a year. Has had an injection,this lasted about 5 months.    1. No - Have you ever had a heart attack or stroke?  2. No - Have you ever had surgery on your heart or blood vessels, such as a stent, coronary (heart) bypass, or surgery on an artery in the head, neck, heart, or legs?  3. No - Do you have chest pain when you are physically active?  4. No - Do you have a history of heart failure?  5. No - Do you currently have a cold, bronchitis, or symptoms of other respiratory (head and chest) infections?  6. No - Do you have a cough, shortness of breath, or wheezing?  7. No - Do you or anyone in your family have a history of blood clots?  8. No - Do you or anyone in your family have a serious bleeding problem, such as long-lasting bleeding after surgeries or cuts?  9. No - Have you ever had anemia or been told to take iron pills?  10. No - Have you had any abnormal blood loss such as black, tarry or bloody stools, or abnormal vaginal bleeding?  11. No - Have you ever had a blood transfusion?  12. Yes - Are you willing to have a blood transfusion if it is medically needed before, during, or after your surgery?  13. No - Have you or anyone in your family ever had problems with anesthesia (sedation for surgery)?  14. No - Do you have sleep apnea, excessive snoring, or daytime drowsiness?   15. No - Do you have any artifical heart valves or  other implanted medical devices, such as a pacemaker, defibrillator, or continuous glucose monitor?  16. No - Do you have any artifical joints?  17. No - Are you allergic to latex?  18. No - Is there any chance that you may be pregnant?    Health Care Directive:  Patient does not have a Health Care Directive or Living Will: Discussed advance care planning with patient; information given to patient to review.    Preoperative Review of :   reviewed - no record of controlled substances prescribed.      Status of Chronic Conditions:  HYPERLIPIDEMIA - Patient has a long history of significant Hyperlipidemia requiring medication for treatment with recent good control. Patient reports no problems or side effects with the medication.     HYPERTENSION - Patient has longstanding history of HTN , currently denies any symptoms referable to elevated blood pressure. Specifically denies chest pain, palpitations, dyspnea, orthopnea, PND or peripheral edema. Blood pressure readings have been in normal range. Current medication regimen is as listed below. Patient denies any side effects of medication.       Review of Systems  CONSTITUTIONAL: NEGATIVE for fever, chills, change in weight  INTEGUMENTARY/SKIN: NEGATIVE for worrisome rashes, moles or lesions  EYES: NEGATIVE for vision changes or irritation  ENT/MOUTH: NEGATIVE for ear, mouth and throat problems  RESP: NEGATIVE for significant cough or SOB  CV: NEGATIVE for chest pain, palpitations or peripheral edema  GI: NEGATIVE for nausea, abdominal pain, heartburn, or change in bowel habits  : NEGATIVE for frequency, dysuria, or hematuria  MUSCULOSKELETAL: NEGATIVE for significant arthralgias or myalgia  NEURO: NEGATIVE for weakness, dizziness or paresthesias  ENDOCRINE: NEGATIVE for temperature intolerance, skin/hair changes  HEME: NEGATIVE for bleeding problems  PSYCHIATRIC: NEGATIVE for changes in mood or affect    Patient Active Problem List    Diagnosis Date Noted      "Personal history of tobacco use, presenting hazards to health 10/04/2022     Priority: Medium     Family history of ischemic heart disease 10/05/2021     Priority: Medium     Heart murmur 10/05/2021     Priority: Medium     Coronary artery disease involving native coronary artery of native heart without angina pectoris 09/15/2021     Priority: Medium     Kidney stone 09/15/2021     Priority: Medium     History of melanoma 05/25/2021     Priority: Medium     Essential hypertension 05/05/2021     Priority: Medium     Other hyperlipidemia 05/05/2021     Priority: Medium     Abnormal result of other cardiovascular function study 07/18/2008     Priority: Medium     Formatting of this note might be different from the original.  Abnormal stress echo       Other chest pain 07/17/2008     Priority: Medium     Health Care Home 05/05/2021     Priority: Low     ACP (advance care planning) 05/05/2021     Priority: Low      No past medical history on file.  No past surgical history on file.  Current Outpatient Medications   Medication Sig Dispense Refill     aspirin 81 MG EC tablet Take 81 mg by mouth daily       calcium carbonate (OS-BIBI) 1500 (600 Ca) MG tablet 2 times daily.       Cholecalciferol (VITAMIN D3) 50 MCG (2000 UT) CAPS        lisinopril (ZESTRIL) 40 MG tablet TAKE 1 TABLET BY MOUTH EVERY DAY 90 tablet 3     Niacin-Inositol 400-100 MG CAPS Take 1 capsule by mouth       simvastatin (ZOCOR) 40 MG tablet TAKE 1 TABLET BY MOUTH EVERY EVENING 90 tablet 3       No Known Allergies     Social History     Tobacco Use     Smoking status: Every Day     Smokeless tobacco: Never   Substance Use Topics     Alcohol use: Not Currently     No family history on file.  History   Drug Use Not on file         Objective     /78 (BP Location: Left arm, Patient Position: Sitting, Cuff Size: Adult Large)   Pulse 57   Temp 97.8  F (36.6  C) (Temporal)   Ht 1.486 m (4' 10.5\")   Wt 66.5 kg (146 lb 9.6 oz)   LMP  (LMP Unknown)   " SpO2 99%   BMI 30.12 kg/m      Physical Exam    GENERAL APPEARANCE: healthy, alert and no distress     EYES: EOMI, PERRL     HENT: ear canals and TM's normal and nose and mouth without ulcers or lesions     NECK: no adenopathy, no asymmetry, masses, or scars and thyroid normal to palpation     RESP: lungs clear to auscultation - no rales, rhonchi or wheezes     CV: regular rates and rhythm, normal S1 S2, no S3 or S4 and no murmur, click or rub     ABDOMEN:  soft, nontender, no HSM or masses and bowel sounds normal     MS: extremities normal- no gross deformities noted, no evidence of inflammation in joints, FROM in all extremities.     SKIN: no suspicious lesions or rashes     NEURO: Normal strength and tone, sensory exam grossly normal, mentation intact and speech normal     PSYCH: mentation appears normal. and affect normal/bright     LYMPHATICS: No cervical adenopathy    Recent Labs   Lab Test 10/04/22  0000 10/05/21  0000   .4 141.7   POTASSIUM 4.62 4.36   CR 1.00 1.04        Diagnostics:  Recent Results (from the past 720 hour(s))   Basic Metabolic Panel (BFP)    Collection Time: 02/15/23 12:00 AM   Result Value Ref Range    Carbon Dioxide 27.9 20 - 32 mmol/L    Creatinine 1.06 0.60 - 1.30 mg/dL    Glucose 94 60 - 99 mg/dL    Sodium 137.4 135 - 146 mmol/L    Potassium 4.28 3.5 - 5.3 mmol/L    Chloride 100.7 98 - 110 mmol/L    Urea Nitrogen 12 7 - 25 mg/dL    Calcium 9.7 8.6 - 10.3 mg/dL    BUN/Creatinine Ratio 11.3 6 - 22   HEMOGRAM PLATELET DIFF (BFP)    Collection Time: 02/15/23 12:00 AM   Result Value Ref Range    WBC 9.2 4.0 - 11 10*9/L    RBC Count 4.34 3.8 - 5.2 10*12/L    Hemoglobin 14.2 11.7 - 15.7 g/dL    Hematocrit 42.2 35.0 - 47.0 %    MCV 97.3 78 - 100 fL    MCH 32.7 26 - 33 pg    MCHC 33.6 31 - 36 g/dL    Platelet Count 276 150 - 375 10^9/L    % Granulocytes 62.8 %    % Lymphocytes 30.5 %    % Monocytes 6.7 %      EKG: appears normal, NSR   Discussed with cardiology PA. She has previously  "reported chest pain, saw cardiology who recommended a stress test.  Stress echo done 02.24.2023:    \"stress echo conclusions: normal study. No evidence of ischemia or infarction\"    Resting echocardiographic findings:  1.technically difficult study due to pulmonary artifact  2.normal resting left ventricular size, estimated ef 60-65%. No resting regional wall motion abnormalities noted.  3.grad 1 diastolic dysfunction (diastolic relaxation abnormality).  4.calcified aortic valve. No evidence of aortic stenosis. Trivial aortic insufficiency.  5.structurally and functionally unremarkable mitral, tricuspid and pulmonic valves.\"    Revised Cardiac Risk Index (RCRI):  The patient has the following serious cardiovascular risks for perioperative complications:   - No serious cardiac risks = 0 points     RCRI Interpretation: 0 points: Class I (very low risk - 0.4% complication rate)           Signed Electronically by: Shani Argueta MD  Copy of this evaluation report is provided to requesting physician.      "

## 2023-03-03 ENCOUNTER — TRANSFERRED RECORDS (OUTPATIENT)
Dept: FAMILY MEDICINE | Facility: CLINIC | Age: 78
End: 2023-03-03

## 2023-03-07 ENCOUNTER — TRANSFERRED RECORDS (OUTPATIENT)
Dept: FAMILY MEDICINE | Facility: CLINIC | Age: 78
End: 2023-03-07

## 2023-03-14 ENCOUNTER — TRANSFERRED RECORDS (OUTPATIENT)
Dept: FAMILY MEDICINE | Facility: CLINIC | Age: 78
End: 2023-03-14

## 2023-07-16 ENCOUNTER — HEALTH MAINTENANCE LETTER (OUTPATIENT)
Age: 78
End: 2023-07-16

## 2023-07-24 NOTE — PROGRESS NOTES
"Assessment & Plan   Problem List Items Addressed This Visit          Other    Squamous cell carcinoma in situ     Other Visit Diagnoses       Acute bilateral low back pain with left-sided sciatica    -  Primary    Relevant Orders    Physical Therapy Referral    Personal history of tobacco use        Relevant Orders    Prof fee: Shared Decision Making for Lung Cancer Screening (Completed)    CT Chest Lung Cancer Scrn Low Dose wo    Radiology Referral             1. Acute bilateral low back pain with left-sided sciatica  Referral to physical therapy and recheck in 2-3 weeks.  - Physical Therapy Referral    2. Squamous cell carcinoma in situ  Followed by dermatology    3. Personal history of tobacco use  Discussed risks and benefits.  - Prof fee: Shared Decision Making for Lung Cancer Screening  - CT Chest Lung Cancer Scrn Low Dose wo  - Radiology Referral          BMI:   Estimated body mass index is 30.12 kg/m  as calculated from the following:    Height as of 2/15/23: 1.486 m (4' 10.5\").    Weight as of 2/15/23: 66.5 kg (146 lb 9.6 oz).         FUTURE APPOINTMENTS:       - Follow-up visit in in 2-3 weeks.    No follow-ups on file.    Shani Argueta MD  [facility]    Subjective     Nursing Notes:   Lo Munguia [facility] [address]  7/26/2023 12:57 PM  Signed  Chief Complaint   Patient presents with    Back Pain     Low back pain started few weeks ago, pain and tingling shoots from her back down both of her legs      Pre-visit Screening:  Immunizations:  up to date  Colonoscopy:  is up to date  Mammogram: is up to date  Asthma Action Test/Plan:  NA  PHQ9:  NA  GAD7:  NA  Questioned patient about current smoking habits Pt.  Smokes.  Ok to leave detailed message on voice mail for today's visit only Yes, phone # [phone]         Concha De Jesus is a 78 year old female who presents to clinic today for the following health issues   HPI     Low back pain, goes down sides of both legs. Has been able to play golf, " sore by the time she gets to the golf course but then is able to play. Has been for 1 1/2weeks. No injury recently. No previous back problems.         Review of Systems   Constitutional, HEENT, cardiovascular, pulmonary, gi and gu systems are negative, except as otherwise noted.      Objective    /74 (BP Location: Right arm, Patient Position: Sitting, Cuff Size: Adult Large)   Pulse 77   Temp 97.9  F (36.6  C) (Temporal)   LMP  (LMP Unknown)   SpO2 97%   There is no height or weight on file to calculate BMI.  Physical Exam   GENERAL: healthy, alert and no distress  MS: no gross musculoskeletal defects noted, no edema  NEURO: Normal strength and tone, mentation intact and speech normal  PSYCH: mentation appears normal, affect normal/bright  Bilateral LE normal strength, straight leg raise, back normal rom          Lung Cancer Screening Shared Decision Making Visit     Concha De Jesus, a 78 year old female, is eligible for lung cancer screening    History   Smoking Status    Every Day   Smokeless Tobacco    Never       I have discussed with patient the risks and benefits of screening for lung cancer with low-dose CT.     The risks include:    radiation exposure: one low dose chest CT has as much ionizing radiation as about 15 chest x-rays, or 6 months of background radiation living in [place]      false positives: most findings/nodules are NOT cancer, but some might still require additional diagnostic evaluation, including biopsy    over-diagnosis: some slow growing cancers that might never have been clinically significant will be detected and treated unnecessarily     The benefit of early detection of lung cancer is contingent upon adherence to annual screening or more frequent follow up if indicated.     Furthermore, to benefit from screening, Concha must be willing and able to undergo diagnostic procedures, if indicated. Although no specific guide is available for determining severity of comorbidities,  it is reasonable to withhold screening in patients who have greater mortality risk from other diseases.     We did discuss that the best way to prevent lung cancer is to not smoke.    Some patients may value a numeric estimation of lung cancer risk when evaluating if lung cancer screening is right for them, here is one calculator:    ShouldIScreen

## 2023-07-26 ENCOUNTER — OFFICE VISIT (OUTPATIENT)
Dept: FAMILY MEDICINE | Facility: CLINIC | Age: 78
End: 2023-07-26

## 2023-07-26 VITALS
TEMPERATURE: 97.9 F | DIASTOLIC BLOOD PRESSURE: 74 MMHG | OXYGEN SATURATION: 97 % | HEART RATE: 77 BPM | SYSTOLIC BLOOD PRESSURE: 122 MMHG

## 2023-07-26 DIAGNOSIS — M54.42 ACUTE BILATERAL LOW BACK PAIN WITH LEFT-SIDED SCIATICA: Primary | ICD-10-CM

## 2023-07-26 DIAGNOSIS — Z87.891 PERSONAL HISTORY OF TOBACCO USE: ICD-10-CM

## 2023-07-26 DIAGNOSIS — I25.10 CORONARY ARTERY CALCIFICATION: ICD-10-CM

## 2023-07-26 DIAGNOSIS — D09.9 SQUAMOUS CELL CARCINOMA IN SITU: ICD-10-CM

## 2023-07-26 PROCEDURE — 99214 OFFICE O/P EST MOD 30 MIN: CPT | Performed by: FAMILY MEDICINE

## 2023-07-26 PROCEDURE — G0296 VISIT TO DETERM LDCT ELIG: HCPCS | Performed by: FAMILY MEDICINE

## 2023-07-26 NOTE — NURSING NOTE
Chief Complaint   Patient presents with     Back Pain     Low back pain started few weeks ago, pain and tingling shoots from her back down both of her legs      Pre-visit Screening:  Immunizations:  up to date  Colonoscopy:  is up to date  Mammogram: is up to date  Asthma Action Test/Plan:  NA  PHQ9:  NA  GAD7:  NA  Questioned patient about current smoking habits Pt.  Smokes.  Ok to leave detailed message on voice mail for today's visit only Yes, phone # [phone]

## 2023-07-26 NOTE — PATIENT INSTRUCTIONS
Lung Cancer Screening   Frequently Asked Questions  If you are at high-risk for lung cancer, getting screened with low-dose computed tomography (LDCT) every year can help save your life. This handout offers answers to some of the most common questions about lung cancer screening. If you have other questions, please call [phone] ([phone]).     What is it?  Lung cancer screening uses special X-ray technology to create an image of your lung tissue. The exam is quick and easy and takes less than 10 seconds. We don t give you any medicine or use any needles. You can eat before and after the exam. You don t need to change your clothes as long as the clothing on your chest doesn t contain metal. But, you do need to be able to hold your breath for at least 6 seconds during the exam.    What is the goal of lung cancer screening?  The goal of lung cancer screening is to save lives. Many times, lung cancer is not found until a person starts having physical symptoms. Lung cancer screening can help detect lung cancer in the earliest stages when it may be easier to treat.    Who should be screened for lung cancer?  We suggest lung cancer screening for anyone who is at high-risk for lung cancer. You are in the high-risk group if you:      are between the ages of 55 and 79, and    have smoked at least 1 pack of cigarettes a day for 20 or more years, and    still smoke or have quit within the past 15 years.    However, if you have a new cough or shortness of breath, you should talk to your doctor before being screened.    Why does it matter if I have symptoms?  Certain symptoms can be a sign that you have a condition in your lungs that should be checked and treated by your doctor. These symptoms include fever, chest pain, a new or changing cough, shortness of breath that you have never felt before, coughing up blood or unexplained weight loss. Having any of these symptoms can greatly affect the results of lung  cancer screening.       Should all smokers get an LDCT lung cancer screening exam?  It depends. Lung cancer screening is for a very specific group of men and women who have a history of heavy smoking over a long period of time (see  Who should be screened for lung cancer  above).  I am in the high-risk group, but have been diagnosed with cancer in the past. Is LDCT lung cancer screening right for me?  In some cases, you should not have LDCT lung screening, such as when your doctor is already following your cancer with CT scan studies. Your doctor will help you decide if LDCT lung screening is right for you.  Do I need to have a screening exam every year?  Yes. If you are in the high-risk group described earlier, you should get an LDCT lung cancer screening exam every year until you are 79, or are no longer willing or able to undergo screening and possible procedures to diagnose and treat lung cancer.  How effective is LDCT at preventing death from lung cancer?  Studies have shown that LDCT lung cancer screening can lower the risk of death from lung cancer by 20 percent in people who are at high-risk.  What are the risks?  There are some risks and limitations of LDCT lung cancer screening. We want to make sure you understand the risks and benefits, so please let us know if you have any questions. Your doctor may want to talk with you more about these risks.    Radiation exposure: As with any exam that uses radiation, there is a very small increased risk of cancer. The amount of radiation in LDCT is small--about the same amount a person would get from a mammogram. Your doctor orders the exam when he or she feels the potential benefits outweigh the risks.    False negatives: No test is perfect, including LDCT. It is possible that you may have a medical condition, including lung cancer, that is not found during your exam. This is called a false negative result.    False positives and more testing: LDCT very often finds  something in the lung that could be cancer, but in fact is not. This is called a false positive result. False positive tests often cause anxiety. To make sure these findings are not cancer, you may need to have more tests. These tests will be done only if you give us permission. Sometimes patients need a treatment that can have side effects, such as a biopsy. For more information on false positives, see  What can I expect from the results?     Findings not related to lung cancer: Your LDCT exam also takes pictures of areas of your body next to your lungs. In a very small number of cases, the CT scan will show an abnormal finding in one of these areas, such as your kidneys, adrenal glands, liver or thyroid. This finding may not be serious, but you may need more tests. Your doctor can help you decide what other tests you may need, if any.  What can I expect from the results?  About 1 out of 4 LDCT exams will find something that may need more tests. Most of the time, these findings are lung nodules. Lung nodules are very small collections of tissue in the lung. These nodules are very common, and the vast majority--more than 97 percent--are not cancer (benign). Most are normal lymph nodes or small areas of scarring from past infections.  But, if a small lung nodule is found to be cancer, the cancer can be cured more than 90 percent of the time. To know if the nodule is cancer, we may need to get more images before your next yearly screening exam. If the nodule has suspicious features (for example, it is large, has an odd shape or grows over time), we will refer you to a specialist for further testing.  Will my doctor also get the results?  Yes. Your doctor will get a copy of your results.  Is it okay to keep smoking now that there s a cancer screening exam?  No. Tobacco is one of the strongest cancer-causing agents. It causes not only lung cancer, but other cancers and cardiovascular (heart) diseases as well. The damage  caused by smoking builds over time. This means that the longer you smoke, the higher your risk of disease. While it is never too late to quit, the sooner you quit, the better.  Where can I find help to quit smoking?  The best way to prevent lung cancer is to stop smoking. If you have already quit smoking, congratulations and keep it up! For help on quitting smoking, please call [employer] at 9-792-QUITNOW ([phone]) or the [employer] at 1-710.688.1026 to find local resources near you.  One-on-one health coaching:  If you d prefer to work individually with a health care provider on tobacco cessation, we offer:      Medication Therapy Management:  Our specially trained pharmacists work closely with you and your doctor to help you quit smoking.  Call [phone] or [phone] (toll free).

## 2023-08-03 ENCOUNTER — TELEPHONE (OUTPATIENT)
Dept: FAMILY MEDICINE | Facility: CLINIC | Age: 78
End: 2023-08-03

## 2023-08-03 NOTE — TELEPHONE ENCOUNTER
"Concha called wanting to speak with Dr. Argueta after she received a call from  [facility] to schedule.  She has some questions and concerns about her diagnosis \"Coronary artery calcification.\"  She wanted to speak with Dr. Argueta only and did not want to do it via [employer].  I informed her that Dr. Argueta is gone today and back in clinic tomorrow.      Pt phone # [phone]      "

## 2023-10-05 ENCOUNTER — OFFICE VISIT (OUTPATIENT)
Dept: CARDIOLOGY | Facility: CLINIC | Age: 78
End: 2023-10-05
Payer: COMMERCIAL

## 2023-10-05 VITALS
HEART RATE: 67 BPM | WEIGHT: 147 LBS | BODY MASS INDEX: 29.64 KG/M2 | DIASTOLIC BLOOD PRESSURE: 73 MMHG | HEIGHT: 59 IN | SYSTOLIC BLOOD PRESSURE: 131 MMHG | OXYGEN SATURATION: 97 %

## 2023-10-05 DIAGNOSIS — I25.10 CORONARY ARTERY CALCIFICATION: ICD-10-CM

## 2023-10-05 DIAGNOSIS — I25.10 CALCIFIC CORONARY ARTERIOSCLEROSIS: ICD-10-CM

## 2023-10-05 DIAGNOSIS — E78.5 DYSLIPIDEMIA: Primary | ICD-10-CM

## 2023-10-05 PROCEDURE — 99204 OFFICE O/P NEW MOD 45 MIN: CPT | Performed by: INTERNAL MEDICINE

## 2023-10-05 RX ORDER — NITROGLYCERIN 0.4 MG/1
TABLET SUBLINGUAL
Qty: 20 TABLET | Refills: 3 | Status: SHIPPED | OUTPATIENT
Start: 2023-10-05

## 2023-10-05 RX ORDER — ROSUVASTATIN CALCIUM 40 MG/1
40 TABLET, COATED ORAL AT BEDTIME
Qty: 90 TABLET | Refills: 3 | Status: SHIPPED | OUTPATIENT
Start: 2023-10-05 | End: 2024-09-11

## 2023-10-05 NOTE — PROGRESS NOTES
"    Cardiology Clinic Consultation:    October 5, 2023   Patient Name: Concha De Jesus  Patient MRN: [id number]    Consult indication: CAC    HPI:    I had the opportunity to see patient Concha De Jesus in cardiology clinic for a consultation. Patient is followed by our colleague Shani Argueta MD with Primary Care.     As you know patient is a pleasant 78-year-old female with a past medical history significant for hypertension, dyslipidemia, longstanding smoking, who presents for further evaluation management of coronary artery calcification seen on CT lung cancer screening study.    At baseline patient reports that she is somewhat physically active, she is to be much more active in her younger years.  She walks almost every day with her sister, also climbs stairs at their home several times a day.  She does note some mild dyspnea when climbing up the stairs however this is unchanged over the past couple of years.  She denies any exertional chest pain, chest pressure.  She is to be an avid golfer, still plays now.    Patient was assessed with a CT lung cancer screening study 7/26/2023 that demonstrated normal caliber and markedly calcified aorta, severe coronary artery calcifications.    Regarding prior cardiac history, was seen by her PCP Dr. Argueta for preop assessment prior to left hand surgery, stress echocardiogram 2/20/2023 report was negative for evidence of ischemia or infarction.  Findings copied below.    Patient continues to smoke about a pack per day, has been doing so for about 60 years.  Retired.     Stress echo done 02.24.2023:    \"stress echo conclusions: normal study. No evidence of ischemia or infarction\"     Resting echocardiographic findings:  1.technically difficult study due to pulmonary artifact  2.normal resting left ventricular size, estimated ef 60-65%. No resting regional wall motion abnormalities noted.  3.grad 1 diastolic dysfunction (diastolic relaxation " "abnormality).  4.calcified aortic valve. No evidence of aortic stenosis. Trivial aortic insufficiency.  5.structurally and functionally unremarkable mitral, tricuspid and pulmonic valves.\"    Assessment and Plan/Recommendations:    # Coronary artery calcifications on CT lung cancer screening scan.  No angina. Exercise stress echocardiogram from OSH 2/24/2023 negative for ischemia or infarction, normal resting LVEF 60-65%  # HTN, stbale  # HL, on statin  # Smoking, longstanding    -Reviewed pathophysiology of coronary artery disease, atherosclerosis  - Discussed options for further evaluation and management  - Patient denies symptoms concerning for angina or decompensated heart failure, and reassuringly underwent an exercise stress echocardiogram a few months ago that was reportedly negative  - Advised to continue monitoring for symptoms such as chest pain, chest pressure, abnormal shortness of breath which would warrant further evaluation  - Agree with aspirin  - We will change simvastatin to rosuvastatin, repeat fasting lipids and ALT in 3 months  - Agree with lisinopril  - Sublingual nitroglycerin as needed  - Encouraged smoking cessation  - Patient would like to follow-up in 1 year, hopefully will have cut back on smoking at that time    Thank you for allowing our team to participate in the care of Concha De Jesus.  Please do not hesitate to call or page me with any questions or concerns.    Sincerely,     Aramis Rodrigues MD, [facility]  Cardiology  October 5, 2023      Shani Argueta MD  [address] [place] [address]      Total time spent on this encounter today: 48 minutes, providing care in this encounter including, but not limited to, reviewing prior medical records, laboratory data, imaging studies, diagnostic studies, procedure notes, formulating an assessment and plan, recommendations, discussion and counseling with patient face to face, dictation.    Past Medical History: " "    Patient Active Problem List   Diagnosis    Health Care Home    ACP (advance care planning)    Essential hypertension    Other hyperlipidemia    History of melanoma    Abnormal result of other cardiovascular function study    Coronary artery disease involving native coronary artery of native heart without angina pectoris    Kidney stone    Family history of ischemic heart disease    Heart murmur    Personal history of tobacco use, presenting hazards to health    Acquired cystic kidney disease    Squamous cell carcinoma in situ       Past Surgical History:   History reviewed. No pertinent surgical history.    Medications (outpatient):  Current Outpatient Medications   Medication Sig Dispense Refill    aspirin 81 MG EC tablet Take 81 mg by mouth daily      calcium carbonate (OS-BIBI) 1500 (600 Ca) MG tablet 2 times daily.      Cholecalciferol (VITAMIN D3) 50 MCG (2000 UT) CAPS       lisinopril (ZESTRIL) 40 MG tablet TAKE 1 TABLET BY MOUTH EVERY DAY 90 tablet 3    Niacin-Inositol 400-100 MG CAPS Take 1 capsule by mouth      simvastatin (ZOCOR) 40 MG tablet TAKE 1 TABLET BY MOUTH EVERY EVENING 90 tablet 3       Allergies:  No Known Allergies    Social History:   History   Drug Use Unknown      History   Smoking Status    Every Day   Smokeless Tobacco    Never     Social History    Substance and Sexual Activity      Alcohol use: Not Currently       Family History:  History reviewed. No pertinent family history.    Review of Systems:   A comprehensive 12 system review of systems was carried out.  Pertinent positives and negatives are noted above. Otherwise negative for contributory information.    Objective & Physical Exam:  /73   Pulse 67   Ht 1.486 m (4' 10.5\")   Wt 66.7 kg (147 lb)   LMP  (LMP Unknown)   SpO2 97%   BMI 30.20 kg/m    Wt Readings from Last 2 Encounters:   10/05/23 66.7 kg (147 lb)   02/15/23 66.5 kg (146 lb 9.6 oz)     Body mass index is 30.2 kg/m .   Body surface area is 1.66 meters " squared.    Constitutional: appears stated age, in no apparent distress, appears to be well nourished  Head: normocephalic, atraumatic  Neck: supple, trachea midline  Pulmonary: clear to auscultation bilaterally, no wheezes, no rales, no increased work of breathing  Cardiovascular: JVP normal, regular rate, regular rhythm, normal S1 and S2, no S3, S4, 1/6 LAURIE at the RUSB, no lower extremity edema  Gastrointestinal: no guarding, non-rigid   Neurologic: awake, alert, moves all extremities  Skin: no jaundice, warm on limited exam  Psychiatric: affect is normal, answers questions appropriately, oriented to self and place    Data reviewed:  Lab Results   Component Value Date    WBC 9.2 02/15/2023    RBC 4.34 02/15/2023    HGB 14.2 02/15/2023    HCT 42.2 02/15/2023    MCV 97.3 02/15/2023    MCH 32.7 02/15/2023    MCHC 33.6 02/15/2023     02/15/2023     Sodium   Date Value Ref Range Status   02/15/2023 137.4 135 - 146 mmol/L Final     Potassium   Date Value Ref Range Status   02/15/2023 4.28 3.5 - 5.3 mmol/L Final     Chloride   Date Value Ref Range Status   02/15/2023 100.7 98 - 110 mmol/L Final     Carbon Dioxide   Date Value Ref Range Status   02/15/2023 27.9 20 - 32 mmol/L Final     Glucose   Date Value Ref Range Status   02/15/2023 94 60 - 99 mg/dL Final     Urea Nitrogen   Date Value Ref Range Status   02/15/2023 12 7 - 25 mg/dL Final     BUN/Creatinine Ratio   Date Value Ref Range Status   02/15/2023 11.3 6 - 22 Final     Creatinine   Date Value Ref Range Status   02/15/2023 1.06 0.60 - 1.30 mg/dL Final     Calcium   Date Value Ref Range Status   02/15/2023 9.7 8.6 - 10.3 mg/dL Final     Bilirubin Total   Date Value Ref Range Status   10/04/2022 0.8 0.2 - 1.2 mg/dL Final     Alkaline Phosphatase   Date Value Ref Range Status   10/04/2022 56 33 - 130 U/L Final     Recent Labs   Lab Test 10/04/22  0000 10/05/21  0000   CHOL 146 135   HDL 53 44   LDL 73 63   TRIG 102 142   CHOLHDLRATIO 3 3

## 2023-10-05 NOTE — LETTER
"10/5/2023    Shani Argueta MD  [address]  [facility] [address]    RE: Concha De Jesus       Dear Colleague,     I had the pleasure of seeing Concha De Jesus in the [facility].      Cardiology Clinic Consultation:    October 5, 2023   Patient Name: Concha De Jesus  Patient MRN: [id number]    Consult indication: CAC    HPI:    I had the opportunity to see patient Concha De Jesus in cardiology clinic for a consultation. Patient is followed by our colleague Shani Argueta MD with Primary Care.     As you know patient is a pleasant 78-year-old female with a past medical history significant for hypertension, dyslipidemia, longstanding smoking, who presents for further evaluation management of coronary artery calcification seen on CT lung cancer screening study.    At baseline patient reports that she is somewhat physically active, she is to be much more active in her younger years.  She walks almost every day with her sister, also climbs stairs at their home several times a day.  She does note some mild dyspnea when climbing up the stairs however this is unchanged over the past couple of years.  She denies any exertional chest pain, chest pressure.  She is to be an avid golfer, still plays now.    Patient was assessed with a CT lung cancer screening study 7/26/2023 that demonstrated normal caliber and markedly calcified aorta, severe coronary artery calcifications.    Regarding prior cardiac history, was seen by her PCP Dr. Argueta for preop assessment prior to left hand surgery, stress echocardiogram 2/20/2023 report was negative for evidence of ischemia or infarction.  Findings copied below.    Patient continues to smoke about a pack per day, has been doing so for about 60 years.  Retired.     Stress echo done 02.24.2023:    \"stress echo conclusions: normal study. No evidence of ischemia or infarction\"     Resting echocardiographic findings:  1.technically difficult study due to " "pulmonary artifact  2.normal resting left ventricular size, estimated ef 60-65%. No resting regional wall motion abnormalities noted.  3.grad 1 diastolic dysfunction (diastolic relaxation abnormality).  4.calcified aortic valve. No evidence of aortic stenosis. Trivial aortic insufficiency.  5.structurally and functionally unremarkable mitral, tricuspid and pulmonic valves.\"    Assessment and Plan/Recommendations:    # Coronary artery calcifications on CT lung cancer screening scan.  No angina. Exercise stress echocardiogram from OSH 2/24/2023 negative for ischemia or infarction, normal resting LVEF 60-65%  # HTN, stbale  # HL, on statin  # Smoking, longstanding    -Reviewed pathophysiology of coronary artery disease, atherosclerosis  - Discussed options for further evaluation and management  - Patient denies symptoms concerning for angina or decompensated heart failure, and reassuringly underwent an exercise stress echocardiogram a few months ago that was reportedly negative  - Advised to continue monitoring for symptoms such as chest pain, chest pressure, abnormal shortness of breath which would warrant further evaluation  - Agree with aspirin  - We will change simvastatin to rosuvastatin, repeat fasting lipids and ALT in 3 months  - Agree with lisinopril  - Sublingual nitroglycerin as needed  - Encouraged smoking cessation  - Patient would like to follow-up in 1 year, hopefully will have cut back on smoking at that time    Thank you for allowing our team to participate in the care of Concha De Jesus.  Please do not hesitate to call or page me with any questions or concerns.    Sincerely,     Aramis Rodrigues MD, [facility]  Cardiology  October 5, 2023      Shani Argueta MD  [address] [place] [address]      Total time spent on this encounter today: 48 minutes, providing care in this encounter including, but not limited to, reviewing prior medical records, laboratory data, imaging studies, " "diagnostic studies, procedure notes, formulating an assessment and plan, recommendations, discussion and counseling with patient face to face, dictation.    Past Medical History:     Patient Active Problem List   Diagnosis    Health Care Home    ACP (advance care planning)    Essential hypertension    Other hyperlipidemia    History of melanoma    Abnormal result of other cardiovascular function study    Coronary artery disease involving native coronary artery of native heart without angina pectoris    Kidney stone    Family history of ischemic heart disease    Heart murmur    Personal history of tobacco use, presenting hazards to health    Acquired cystic kidney disease    Squamous cell carcinoma in situ       Past Surgical History:   History reviewed. No pertinent surgical history.    Medications (outpatient):  Current Outpatient Medications   Medication Sig Dispense Refill    aspirin 81 MG EC tablet Take 81 mg by mouth daily      calcium carbonate (OS-BIBI) 1500 (600 Ca) MG tablet 2 times daily.      Cholecalciferol (VITAMIN D3) 50 MCG (2000 UT) CAPS       lisinopril (ZESTRIL) 40 MG tablet TAKE 1 TABLET BY MOUTH EVERY DAY 90 tablet 3    Niacin-Inositol 400-100 MG CAPS Take 1 capsule by mouth      simvastatin (ZOCOR) 40 MG tablet TAKE 1 TABLET BY MOUTH EVERY EVENING 90 tablet 3       Allergies:  No Known Allergies    Social History:   History   Drug Use Unknown      History   Smoking Status    Every Day   Smokeless Tobacco    Never     Social History    Substance and Sexual Activity      Alcohol use: Not Currently       Family History:  History reviewed. No pertinent family history.    Review of Systems:   A comprehensive 12 system review of systems was carried out.  Pertinent positives and negatives are noted above. Otherwise negative for contributory information.    Objective & Physical Exam:  /73   Pulse 67   Ht 1.486 m (4' 10.5\")   Wt 66.7 kg (147 lb)   LMP  (LMP Unknown)   SpO2 97%   BMI 30.20 " kg/m    Wt Readings from Last 2 Encounters:   10/05/23 66.7 kg (147 lb)   02/15/23 66.5 kg (146 lb 9.6 oz)     Body mass index is 30.2 kg/m .   Body surface area is 1.66 meters squared.    Constitutional: appears stated age, in no apparent distress, appears to be well nourished  Head: normocephalic, atraumatic  Neck: supple, trachea midline  Pulmonary: clear to auscultation bilaterally, no wheezes, no rales, no increased work of breathing  Cardiovascular: JVP normal, regular rate, regular rhythm, normal S1 and S2, no S3, S4, 1/6 LAURIE at the RUSB, no lower extremity edema  Gastrointestinal: no guarding, non-rigid   Neurologic: awake, alert, moves all extremities  Skin: no jaundice, warm on limited exam  Psychiatric: affect is normal, answers questions appropriately, oriented to self and place    Data reviewed:  Lab Results   Component Value Date    WBC 9.2 02/15/2023    RBC 4.34 02/15/2023    HGB 14.2 02/15/2023    HCT 42.2 02/15/2023    MCV 97.3 02/15/2023    MCH 32.7 02/15/2023    MCHC 33.6 02/15/2023     02/15/2023     Sodium   Date Value Ref Range Status   02/15/2023 137.4 135 - 146 mmol/L Final     Potassium   Date Value Ref Range Status   02/15/2023 4.28 3.5 - 5.3 mmol/L Final     Chloride   Date Value Ref Range Status   02/15/2023 100.7 98 - 110 mmol/L Final     Carbon Dioxide   Date Value Ref Range Status   02/15/2023 27.9 20 - 32 mmol/L Final     Glucose   Date Value Ref Range Status   02/15/2023 94 60 - 99 mg/dL Final     Urea Nitrogen   Date Value Ref Range Status   02/15/2023 12 7 - 25 mg/dL Final     BUN/Creatinine Ratio   Date Value Ref Range Status   02/15/2023 11.3 6 - 22 Final     Creatinine   Date Value Ref Range Status   02/15/2023 1.06 0.60 - 1.30 mg/dL Final     Calcium   Date Value Ref Range Status   02/15/2023 9.7 8.6 - 10.3 mg/dL Final     Bilirubin Total   Date Value Ref Range Status   10/04/2022 0.8 0.2 - 1.2 mg/dL Final     Alkaline Phosphatase   Date Value Ref Range Status    10/04/2022 56 33 - 130 U/L Final     Recent Labs   Lab Test 10/04/22  0000 10/05/21  0000   CHOL 146 135   HDL 53 44   LDL 73 63   TRIG 102 142   CHOLHDLRATIO 3 3          Thank you for allowing me to participate in the care of your patient.      Sincerely,     Aramis Rodrigues MD     [facility]  cc:   Shani Argueta MD  [address] [place] [address]

## 2023-10-05 NOTE — PATIENT INSTRUCTIONS
October 5, 2023    Thank you for allowing our Cardiology team to participate in your care.     Please note the following changes to your heart treatment plan:     Medication changes:   - stop simvastatin  - start rosuvastatin 40mg at bedtime   - nitroglycerin as needed  - increase fluid intake to goal 2L/day     Tests to be done:  - FASTING cholesterol labs in 3 months    Follow up:  - Follow up in 1 year, or sooner as needed.      For scheduling, please call [phone].      Please contact our team through [employer] or our Nurse Team Voicemail service [phone], or the General Clinic [phone] for any questions or concerns.     If you are having a medical emergency, please call [phone].     Sincerely,    Aramis Rodrigues MD, [facility]  Cardiology    [facility] - [facility] - [facility] - [place]

## 2023-10-06 NOTE — PROGRESS NOTES
"Assessment & Plan   Problem List Items Addressed This Visit          Circulatory    Essential hypertension    Relevant Medications    lisinopril (ZESTRIL) 40 MG tablet    Other Relevant Orders    VENOUS COLLECTION (Completed)    Basic Metabolic Panel (BFP)     Other Visit Diagnoses       Screening for osteoporosis    -  Primary    Relevant Orders    Radiology Referral    Dexa hip/pelvis/spine*    Coronary artery calcification        Initial Medicare annual wellness visit                 1. Essential hypertension  Controlled on medications,check labs, refilled.  - lisinopril (ZESTRIL) 40 MG tablet; TAKE 1 TABLET BY MOUTH EVERY DAY  Dispense: 90 tablet; Refill: 1  - VENOUS COLLECTION  - Basic Metabolic Panel (BFP)    2. Coronary artery calcification  Followed by cardiology.    3. Screening for osteoporosis    - Radiology Referral  - Dexa hip/pelvis/spine*    4. Initial Medicare annual wellness visit  Completed.          Nicotine/Tobacco Cessation:  She reports that she has been smoking. She has never been exposed to tobacco smoke. She has never used smokeless tobacco.  Nicotine/Tobacco Cessation Plan:         BMI:   Estimated body mass index is 29.99 kg/m  as calculated from the following:    Height as of this encounter: 1.486 m (4' 10.5\").    Weight as of this encounter: 66.2 kg (146 lb).         FUTURE APPOINTMENTS:       - Follow-up visit in 6 months.    No follow-ups on file.    Shani Argueta MD  [facility]    Subjective     Nursing Notes:   Lo Munguia CMA  10/9/2023  8:36 AM  Signed  Chief Complaint   Patient presents with    Recheck Medication     Fasting today, refill medications     Pre-visit Screening:  Immunizations:  up to date  Colonoscopy:  is up to date  Mammogram: is up to date  Asthma Action Test/Plan:  NA  PHQ9:  NA  GAD7:  NA  Questioned patient about current smoking habits Pt. Smokes.  Ok to leave detailed message on voice mail for today's visit only Yes, phone # " "[phone]       Concha De Jesus is a 78 year old female who presents to clinic today for the following health issues   HPI     Here with her sister, desirae on blood pressure. She is doing well on medications. Seeing cardiology now and Dr. Rodrigues took over her statin, changed her to crestor. She is to come back for fasting labs per Dr. Rodrigues.        Review of Systems   Constitutional, HEENT, cardiovascular, pulmonary, gi and gu systems are negative, except as otherwise noted.      Objective    /82 (BP Location: Right arm, Patient Position: Sitting, Cuff Size: Adult Large)   Pulse 86   Temp 97.8  F (36.6  C) (Temporal)   Ht 1.486 m (4' 10.5\")   Wt 66.2 kg (146 lb)   LMP  (LMP Unknown)   SpO2 96%   BMI 29.99 kg/m    Body mass index is 29.99 kg/m .  Physical Exam   GENERAL: healthy, alert and no distress  RESP: lungs clear to auscultation - no rales, rhonchi or wheezes  CV: regular rate and rhythm, normal S1 S2, no S3 or S4, no murmur, click or rub, no peripheral edema and peripheral pulses strong  MS: no gross musculoskeletal defects noted, no edema  NEURO: Normal strength and tone, mentation intact and speech normal  PSYCH: mentation appears normal, affect normal/bright    No results found for any visits on 10/09/23.      "

## 2023-10-09 ENCOUNTER — OFFICE VISIT (OUTPATIENT)
Dept: FAMILY MEDICINE | Facility: CLINIC | Age: 78
End: 2023-10-09

## 2023-10-09 VITALS
HEART RATE: 86 BPM | WEIGHT: 146 LBS | OXYGEN SATURATION: 96 % | BODY MASS INDEX: 29.43 KG/M2 | HEIGHT: 59 IN | TEMPERATURE: 97.8 F | DIASTOLIC BLOOD PRESSURE: 82 MMHG | SYSTOLIC BLOOD PRESSURE: 130 MMHG

## 2023-10-09 DIAGNOSIS — I10 ESSENTIAL HYPERTENSION: ICD-10-CM

## 2023-10-09 DIAGNOSIS — Z00.00 INITIAL MEDICARE ANNUAL WELLNESS VISIT: ICD-10-CM

## 2023-10-09 DIAGNOSIS — I25.10 CORONARY ARTERY CALCIFICATION: ICD-10-CM

## 2023-10-09 DIAGNOSIS — Z13.820 SCREENING FOR OSTEOPOROSIS: Primary | ICD-10-CM

## 2023-10-09 LAB
BUN SERPL-MCNC: 10 MG/DL (ref 7–25)
BUN/CREATININE RATIO: 8.6 (ref 6–32)
CALCIUM SERPL-MCNC: 9.2 MG/DL (ref 8.6–10.3)
CHLORIDE SERPLBLD-SCNC: 106.4 MMOL/L (ref 98–110)
CO2 SERPL-SCNC: 27.3 MMOL/L (ref 20–32)
CREAT SERPL-MCNC: 1.16 MG/DL (ref 0.6–1.3)
GLUCOSE SERPL-MCNC: 100 MG/DL (ref 60–99)
POTASSIUM SERPL-SCNC: 4.19 MMOL/L (ref 3.5–5.3)
SODIUM SERPL-SCNC: 141.4 MMOL/L (ref 135–146)

## 2023-10-09 PROCEDURE — G0438 PPPS, INITIAL VISIT: HCPCS | Performed by: FAMILY MEDICINE

## 2023-10-09 PROCEDURE — 80048 BASIC METABOLIC PNL TOTAL CA: CPT | Performed by: FAMILY MEDICINE

## 2023-10-09 PROCEDURE — 36415 COLL VENOUS BLD VENIPUNCTURE: CPT | Performed by: FAMILY MEDICINE

## 2023-10-09 PROCEDURE — 99213 OFFICE O/P EST LOW 20 MIN: CPT | Mod: 25 | Performed by: FAMILY MEDICINE

## 2023-10-09 RX ORDER — LISINOPRIL 40 MG/1
TABLET ORAL
Qty: 90 TABLET | Refills: 1 | Status: SHIPPED | OUTPATIENT
Start: 2023-10-09 | End: 2024-04-26

## 2023-10-09 NOTE — PATIENT INSTRUCTIONS
Health Maintenance   Topic Date Due    DEXA  Never done    MEDICARE ANNUAL WELLNESS VISIT  Never done    INFLUENZA VACCINE (1) 09/01/2023    COVID-19 Vaccine (6 - 2023-24 season) 09/01/2023    LUNG CANCER SCREENING  08/01/2024    NICOTINE/TOBACCO CESSATION COUNSELING Q 1 YR  10/09/2024    FALL RISK ASSESSMENT  10/09/2024    ADVANCE CARE PLANNING  05/05/2026    LIPID  10/04/2027    DTAP/TDAP/TD IMMUNIZATION (3 - Td or Tdap) 11/05/2028    HEPATITIS C SCREENING  Completed    PHQ-2 (once per calendar year)  Completed    Pneumococcal Vaccine: 65+ Years  Completed    ZOSTER IMMUNIZATION  Completed    IPV IMMUNIZATION  Aged Out    HPV IMMUNIZATION  Aged Out    MENINGITIS IMMUNIZATION  Aged Out

## 2023-10-09 NOTE — PROGRESS NOTES
Concha De Jesus is a 78 year old female who presents for Medicare Annual Wellness Visit.    Current providers caring for this patient include:  Patient Care Team:  Shani Argueta MD as PCP - General (Family Medicine)  Shani Argueta MD as Assigned PCP    Complete Medical and Social history reviewed with patient, outlined below.    Patient Active Problem List   Diagnosis    Health Care Home    ACP (advance care planning)    Essential hypertension    Other hyperlipidemia    History of melanoma    Abnormal result of other cardiovascular function study    Coronary artery disease involving native coronary artery of native heart without angina pectoris    Kidney stone    Family history of ischemic heart disease    Heart murmur    Personal history of tobacco use, presenting hazards to health    Acquired cystic kidney disease    Squamous cell carcinoma in situ       No past medical history on file.    No past surgical history on file.    No family history on file.    Social History     Tobacco Use    Smoking status: Every Day     Passive exposure: Never    Smokeless tobacco: Never   Substance Use Topics    Alcohol use: Not Currently       Diet: regular, low salt/low fat  Physical Activity: active without specific exercise program  Depression Screen:    Over the past 2 weeks, patient has felt down, depressed, or hopeless:  No    Over the past 2 weeks, patient has felt little interest or pleasure in doing things: No    Functional ability/Safety screen:  Up and go test (able to get up and walk longer than 30 seconds): Passed  Patient needs assistance with: nothing  Patient's home has the following possible safety concerns: none identified  Patient has concerns about her hearing:  No  Cognitive Screen  Patient repeats three objects (ball, flag, tree)      Clock drawing test: NORMAL    Recalls three objects after 3 minutes (ball,flag,tree):     recalls 3 objects (3 points)    Physical Exam:  /82 (BP Location:  "Right arm, Patient Position: Sitting, Cuff Size: Adult Large)   Pulse 86   Temp 97.8  F (36.6  C) (Temporal)   Ht 1.486 m (4' 10.5\")   Wt 66.2 kg (146 lb)   LMP  (LMP Unknown)   SpO2 96%   BMI 29.99 kg/m     Body mass index is 29.99 kg/m .     Health Maintenance   Topic Date Due    DEXA  Never done    MEDICARE ANNUAL WELLNESS VISIT  Never done    INFLUENZA VACCINE (1) 09/01/2023    COVID-19 Vaccine (6 - 2023-24 season) 09/01/2023    LUNG CANCER SCREENING  08/01/2024    NICOTINE/TOBACCO CESSATION COUNSELING Q 1 YR  10/09/2024    FALL RISK ASSESSMENT  10/09/2024    ADVANCE CARE PLANNING  05/05/2026    LIPID  10/04/2027    DTAP/TDAP/TD IMMUNIZATION (3 - Td or Tdap) 11/05/2028    HEPATITIS C SCREENING  Completed    PHQ-2 (once per calendar year)  Completed    Pneumococcal Vaccine: 65+ Years  Completed    ZOSTER IMMUNIZATION  Completed    IPV IMMUNIZATION  Aged Out    HPV IMMUNIZATION  Aged Out    MENINGITIS IMMUNIZATION  Aged Out         End of Life Planning:   Patient currently has an advanced directive: she has at home and will bring us a copy    Education/Counseling:   Based on review of the above information, the following items were addressed:      Healthy diet and regular exercise.    Appropriate preventive services were discussed with this patient, including applicable screening as appropriate for cardiovascular disease, diabetes, osteopenia/osteoporosis, and glaucoma.  As appropriate for age/gender, discussed screening for colorectal cancer, prostate cancer, breast cancer, and cervical cancer.   Checklist reviewing preventive services available has been given to the patient.          "

## 2023-10-09 NOTE — NURSING NOTE
Chief Complaint   Patient presents with    Recheck Medication     Fasting today, refill medications     Pre-visit Screening:  Immunizations:  up to date  Colonoscopy:  is up to date  Mammogram: is up to date  Asthma Action Test/Plan:  NA  PHQ9:  NA  GAD7:  NA  Questioned patient about current smoking habits Pt. Smokes.  Ok to leave detailed message on voice mail for today's visit only Yes, phone # [phone]

## 2023-11-03 NOTE — PROGRESS NOTES
"Assessment & Plan   Problem List Items Addressed This Visit          Musculoskeletal and Integumentary    Osteopenia of other site - Primary    Relevant Medications    alendronate (FOSAMAX) 70 MG tablet      1. Osteopenia of other site  Discussed risks and benefits of fosamax. Take also calcium and vitamin d, weight bearing exercise. Quit smoking.  - alendronate (FOSAMAX) 70 MG tablet; Take 1 tablet (70 mg) by mouth every 7 days  Dispense: 12 tablet; Refill: 3            BMI:   Estimated body mass index is 29.99 kg/m  as calculated from the following:    Height as of this encounter: 1.486 m (4' 10.5\").    Weight as of this encounter: 66.2 kg (146 lb).         FUTURE APPOINTMENTS:       - Follow-up visit in 12 mo.    No follow-ups on file.    Shani Argueta MD  [facility]    Subjective     Nursing Notes:   Kathryn Velasquez  11/6/2023 10:48 AM  Signed  Chief Complaint   Patient presents with    Results     Discuss Dexa Scan results         Pre-visit Screening:  Immunizations:  up to date  Colonoscopy:  is up to date  Mammogram: is up to date  Asthma Action Test/Plan:  NA  PHQ9:  NA  GAD7:  NA  Questioned patient about current smoking habits Pt. Smoke 20 cigs per day  Ok to leave detailed message on voice mail for today's visit only Yes, phone # [phone]         Concha De Jesus is a 78 year old female who presents to clinic today for the following health issues     HPI     Here to followup on her dexa scan, osteopenia.   She takes calcium and D daily, does some weight bearing exercise but knows she should do more.  New years resolution to quit smoking.  No swallowing problems.        Review of Systems   Constitutional, HEENT, cardiovascular, pulmonary, gi and gu systems are negative, except as otherwise noted.      Objective    /80 (BP Location: Right arm, Patient Position: Sitting, Cuff Size: Adult Large)   Pulse 84   Temp 97.2  F (36.2  C) (Temporal)   Ht 1.486 m (4' 10.5\")   Wt " 66.2 kg (146 lb)   LMP  (LMP Unknown)   BMI 29.99 kg/m    Body mass index is 29.99 kg/m .  Physical Exam   GENERAL: healthy, alert and no distress  MS: no gross musculoskeletal defects noted, no edema  NEURO: Normal strength and tone, mentation intact and speech normal  PSYCH: mentation appears normal, affect normal/bright    No results found for any visits on 11/06/23.

## 2023-11-06 ENCOUNTER — OFFICE VISIT (OUTPATIENT)
Dept: FAMILY MEDICINE | Facility: CLINIC | Age: 78
End: 2023-11-06

## 2023-11-06 VITALS
HEIGHT: 59 IN | SYSTOLIC BLOOD PRESSURE: 128 MMHG | DIASTOLIC BLOOD PRESSURE: 80 MMHG | HEART RATE: 84 BPM | WEIGHT: 146 LBS | BODY MASS INDEX: 29.43 KG/M2 | TEMPERATURE: 97.2 F

## 2023-11-06 DIAGNOSIS — M85.88 OSTEOPENIA OF OTHER SITE: Primary | ICD-10-CM

## 2023-11-06 PROCEDURE — 99214 OFFICE O/P EST MOD 30 MIN: CPT | Performed by: FAMILY MEDICINE

## 2023-11-06 RX ORDER — ALENDRONATE SODIUM 70 MG/1
70 TABLET ORAL
Qty: 12 TABLET | Refills: 3 | Status: SHIPPED | OUTPATIENT
Start: 2023-11-06 | End: 2024-04-26

## 2023-11-06 NOTE — NURSING NOTE
Chief Complaint   Patient presents with    Results     Discuss Dexa Scan results         Pre-visit Screening:  Immunizations:  up to date  Colonoscopy:  is up to date  Mammogram: is up to date  Asthma Action Test/Plan:  NA  PHQ9:  NA  GAD7:  NA  Questioned patient about current smoking habits Pt. Smoke 20 cigs per day  Ok to leave detailed message on voice mail for today's visit only Yes, phone # [phone]

## 2023-11-30 ENCOUNTER — OFFICE VISIT (OUTPATIENT)
Dept: FAMILY MEDICINE | Facility: CLINIC | Age: 78
End: 2023-11-30

## 2023-11-30 VITALS
TEMPERATURE: 97.8 F | HEART RATE: 78 BPM | OXYGEN SATURATION: 97 % | BODY MASS INDEX: 29.43 KG/M2 | HEIGHT: 59 IN | DIASTOLIC BLOOD PRESSURE: 82 MMHG | WEIGHT: 146 LBS | SYSTOLIC BLOOD PRESSURE: 134 MMHG

## 2023-11-30 DIAGNOSIS — I10 ESSENTIAL HYPERTENSION: ICD-10-CM

## 2023-11-30 DIAGNOSIS — Z01.818 PREOPERATIVE EXAMINATION: Primary | ICD-10-CM

## 2023-11-30 DIAGNOSIS — Z87.891 PERSONAL HISTORY OF TOBACCO USE, PRESENTING HAZARDS TO HEALTH: ICD-10-CM

## 2023-11-30 DIAGNOSIS — M79.645 PAIN OF LEFT THUMB: ICD-10-CM

## 2023-11-30 DIAGNOSIS — E78.49 OTHER HYPERLIPIDEMIA: ICD-10-CM

## 2023-11-30 DIAGNOSIS — I25.10 CORONARY ARTERY CALCIFICATION: ICD-10-CM

## 2023-11-30 DIAGNOSIS — M85.88 OSTEOPENIA OF OTHER SITE: ICD-10-CM

## 2023-11-30 LAB
BUN SERPL-MCNC: 10 MG/DL (ref 7–25)
BUN/CREATININE RATIO: 9.7 (ref 6–32)
CALCIUM SERPL-MCNC: 9.9 MG/DL (ref 8.6–10.3)
CHLORIDE SERPLBLD-SCNC: 103.3 MMOL/L (ref 98–110)
CO2 SERPL-SCNC: 28.7 MMOL/L (ref 20–32)
CREAT SERPL-MCNC: 1.03 MG/DL (ref 0.6–1.3)
ERYTHROCYTE [DISTWIDTH] IN BLOOD BY AUTOMATED COUNT: 12.6 %
GLUCOSE SERPL-MCNC: 95 MG/DL (ref 60–99)
HCT VFR BLD AUTO: 41.5 % (ref 35–47)
HEMOGLOBIN: 13.6 G/DL (ref 11.7–15.7)
MCH RBC QN AUTO: 31.6 PG (ref 26–33)
MCHC RBC AUTO-ENTMCNC: 32.8 G/DL (ref 31–36)
MCV RBC AUTO: 96.3 FL (ref 78–100)
PLATELET COUNT - QUEST: 300 10^9/L (ref 150–375)
POTASSIUM SERPL-SCNC: 4.55 MMOL/L (ref 3.5–5.3)
RBC # BLD AUTO: 4.31 10*12/L (ref 3.8–5.2)
SODIUM SERPL-SCNC: 140.3 MMOL/L (ref 135–146)
WBC # BLD AUTO: 7.1 10*9/L (ref 4–11)

## 2023-11-30 PROCEDURE — 93000 ELECTROCARDIOGRAM COMPLETE: CPT

## 2023-11-30 PROCEDURE — 99214 OFFICE O/P EST MOD 30 MIN: CPT

## 2023-11-30 PROCEDURE — 80048 BASIC METABOLIC PNL TOTAL CA: CPT

## 2023-11-30 PROCEDURE — 85027 COMPLETE CBC AUTOMATED: CPT

## 2023-11-30 PROCEDURE — 36415 COLL VENOUS BLD VENIPUNCTURE: CPT

## 2023-11-30 NOTE — PROGRESS NOTES
[facility]  [address]  [address]  [facility] [place] [address]  Phone: [phone]  Fax: [phone]  Primary Provider: Shani Argueta  Pre-op Performing Provider: MARA BRUNSON  PREOPERATIVE EVALUATION:  Today's date: 2023    Concha is a 78 year old ( 47), presenting for the following:  Pre-Op Exam (Surgery/Procedure: Left hand thumb repair /Surgery Location: [facility] /Surgeon: Dr. De La Cruz/Surgery Date: 23)      Surgical Information:  Surgery/Procedure: Left hand thumb repair   Surgery Location: [facility]   Surgeon: Dr. De La Cruz  Surgery Date: 23  Time of Surgery: 9:30 AM  Where patient plans to recover: At home with family  Fax number for surgical facility: [phone]    Assessment & Plan     The proposed surgical procedure is considered INTERMEDIATE risk.    Preoperative examination  - Patient is cleared for surgery. CBC, BMP, and EKG are within normal limits.   - VENOUS COLLECTION  - Hemogram Platelet (BFP)  - Basic Metabolic Panel (BFP)  - EKG 12-lead complete w/read - Clinics    Pain of left thumb    Essential hypertension  - Well controlled.   - VENOUS COLLECTION  - Basic Metabolic Panel (BFP)  - EKG 12-lead complete w/read - Clinics    Other hyperlipidemia  - Well controlled.   - EKG 12-lead complete w/read - Clinics    Coronary artery calcification  - Stable, stress echocardiogram on 2023 was negative for ischemia or infarction.  - EKG 12-lead complete w/read - Clinics    Osteopenia of other site  - Stable.     Personal history of tobacco use, presenting hazards to health    Antiplatelet or Anticoagulation Medication Instructions:   - Patient is on no antiplatelet or anticoagulation medications.      Additional Medication Instructions:  - Patient was instructed to not have anything to eat or drink 12 hours before the procedure other than small sips of water. She has stopped taking her baby aspirin and all other  NSAIDS on 11/28/23 and will continue to not take them and any multivitamins starting today until the day after surgery. She will skip her Lisinopril the morning of surgery.     RECOMMENDATION:  - APPROVAL GIVEN to proceed with proposed procedure, without further diagnostic evaluation.    Haley Kern presents for a preoperative examination for upcoming left thumb surgery on 12/05/23 with Dr. De La Cruz at the [facility].     Past medical history and daily medications reviewed by me personally. All current health problems are well controlled.     HPI related to upcoming procedure:   1. No - Have you ever had a heart attack or stroke?  2. No - Have you ever had surgery on your heart or blood vessels, such as a stent, coronary (heart) bypass, or surgery on an artery in the head, neck, heart, or legs?  3. No - Do you have chest pain when you are physically active?  4. No - Do you have a history of heart failure?  5. No - Do you currently have a cold, bronchitis, or symptoms of other respiratory (head and chest) infections?  6. No - Do you have a cough, shortness of breath, or wheezing?  7. No - Do you or anyone in your family have a history of blood clots?  8. No - Do you or anyone in your family have a serious bleeding problem, such as long-lasting bleeding after surgeries or cuts?  9. No - Have you ever had anemia or been told to take iron pills?  10. No - Have you had any abnormal blood loss such as black, tarry or bloody stools, or abnormal vaginal bleeding?  11. No - Have you ever had a blood transfusion?  12. Yes - Are you willing to have a blood transfusion if it is medically needed before, during, or after your surgery?  13. No - Have you or anyone in your family ever had problems with anesthesia (sedation for surgery)?  14. No - Do you have sleep apnea, excessive snoring, or daytime drowsiness?   15. No - Do you have any artifical heart valves or other implanted medical devices, such as a  pacemaker, defibrillator, or continuous glucose monitor?  16. No - Do you have any artifical joints?  17. No - Are you allergic to latex?  18. No - Is there any chance that you may be pregnant?    Health Care Directive:  Patient does a Health Care Directive or Living Will and will bring in a copy to the clinic.     Preoperative Review of :   reviewed - no record of controlled substances prescribed.    Status of Chronic Conditions:  CAD - Patient has a longstanding history of moderate-severe CAD. Patient denies recent chest pain or NTG use, denies exercise induced dyspnea or PND. Last Stress test 02/20/2023, EKG 11/30/2023.     HYPERLIPIDEMIA - Patient has a long history of significant Hyperlipidemia requiring medication for treatment with recent good control. Patient reports no problems or side effects with the medication.     HYPERTENSION - Patient has longstanding history of HTN , currently denies any symptoms referable to elevated blood pressure. Specifically denies chest pain, palpitations, dyspnea, orthopnea, PND or peripheral edema. Blood pressure readings have been in normal range. Current medication regimen is as listed below. Patient denies any side effects of medication.     Review of Systems  CONSTITUTIONAL: NEGATIVE for fever, chills, change in weight  INTEGUMENTARY/SKIN: NEGATIVE for worrisome rashes, moles or lesions  EYES: NEGATIVE for vision changes or irritation  ENT/MOUTH: NEGATIVE for ear, mouth and throat problems  RESP: NEGATIVE for significant cough or SOB  CV: NEGATIVE for chest pain, palpitations or peripheral edema  GI: NEGATIVE for nausea, abdominal pain, heartburn, or change in bowel habits  : NEGATIVE for frequency, dysuria, or hematuria  MUSCULOSKELETAL: NEGATIVE for significant arthralgias or myalgia  NEURO: NEGATIVE for weakness, dizziness or paresthesias  ENDOCRINE: NEGATIVE for temperature intolerance, skin/hair changes  HEME: NEGATIVE for bleeding problems  PSYCHIATRIC:  NEGATIVE for changes in mood or affect    Patient Active Problem List    Diagnosis Date Noted     Osteopenia of other site 11/06/2023     Priority: Medium     Squamous cell carcinoma in situ 07/26/2023     Priority: Medium     Personal history of tobacco use, presenting hazards to health 10/04/2022     Priority: Medium     Family history of ischemic heart disease 10/05/2021     Priority: Medium     Heart murmur 10/05/2021     Priority: Medium     Coronary artery disease involving native coronary artery of native heart without angina pectoris 09/15/2021     Priority: Medium     Kidney stone 09/15/2021     Priority: Medium     History of melanoma 05/25/2021     Priority: Medium     Essential hypertension 05/05/2021     Priority: Medium     Other hyperlipidemia 05/05/2021     Priority: Medium     Acquired cystic kidney disease 08/19/2016     Priority: Medium     N28.1 : Acquired renal cystic disease       Abnormal result of other cardiovascular function study 07/18/2008     Priority: Medium     Formatting of this note might be different from the original.  Abnormal stress echo       Health Care Home 05/05/2021     Priority: Low     ACP (advance care planning) 05/05/2021     Priority: Low      No past medical history on file.  No past surgical history on file.  Current Outpatient Medications   Medication Sig Dispense Refill     alendronate (FOSAMAX) 70 MG tablet Take 1 tablet (70 mg) by mouth every 7 days 12 tablet 3     aspirin 81 MG EC tablet Take 81 mg by mouth daily       calcium carbonate (OS-BIBI) 1500 (600 Ca) MG tablet 2 times daily.       Cholecalciferol (VITAMIN D3) 50 MCG (2000 UT) CAPS        lisinopril (ZESTRIL) 40 MG tablet TAKE 1 TABLET BY MOUTH EVERY DAY 90 tablet 1     nitroGLYcerin (NITROSTAT) 0.4 MG sublingual tablet For chest pain place 1 tablet under the tongue every 5 minutes for 3 doses. If symptoms persist 5 minutes after 1st dose call 911. 20 tablet 3     rosuvastatin (CRESTOR) 40 MG tablet Take  "1 tablet (40 mg) by mouth at bedtime 90 tablet 3     No Known Allergies     Social History     Tobacco Use     Smoking status: Every Day     Passive exposure: Never     Smokeless tobacco: Never   Substance Use Topics     Alcohol use: Not Currently     No family history on file.  History   Drug Use Unknown         Objective   /82 (BP Location: Right arm, Patient Position: Sitting, Cuff Size: Adult Large)   Pulse 78   Temp 97.8  F (36.6  C) (Temporal)   Ht 1.486 m (4' 10.5\")   Wt 66.2 kg (146 lb)   LMP  (LMP Unknown)   SpO2 97%   BMI 29.99 kg/m      Physical Exam  GENERAL APPEARANCE: healthy, alert and no distress  EYES: EOMI, PERRL  HENT: ear canals and TM's normal and nose and mouth without ulcers or lesions  NECK: no adenopathy, no asymmetry, masses, or scars and thyroid normal to palpation  RESP: lungs clear to auscultation - no rales, rhonchi or wheezes  CV: regular rates and rhythm, normal S1 S2, no S3 or S4 and no murmur, click or rub  ABDOMEN:  soft, nontender, no HSM or masses and bowel sounds normal  MS: extremities normal- no gross deformities noted, no evidence of inflammation in joints, FROM in all extremities.  SKIN: no suspicious lesions or rashes  NEURO: Normal strength and tone, sensory exam grossly normal, mentation intact and speech normal  PSYCH: mentation appears normal. and affect normal/bright    Recent Labs   Lab Test 10/09/23  0000 02/15/23  0000   HGB  --  14.2   PLT  --  276   .4 137.4   POTASSIUM 4.19 4.28   CR 1.16 1.06      Diagnostics:  Recent Results (from the past 48 hour(s))   Basic Metabolic Panel (BFP)    Collection Time: 11/30/23 12:00 AM   Result Value Ref Range    Carbon Dioxide 28.7 20 - 32 mmol/L    Creatinine 1.03 0.60 - 1.30 mg/dL    Glucose 95 60 - 99 mg/dL    Sodium 140.3 135 - 146 mmol/L    Potassium 4.55 3.5 - 5.3 mmol/L    Chloride 103.3 98 - 110 mmol/L    Urea Nitrogen 10 7 - 25 mg/dL    Calcium 9.9 8.6 - 10.3 mg/dL    BUN/Creatinine Ratio 9.7 6 - 32 "   Hemogram Platelet (BFP)    Collection Time: 11/30/23 12:26 PM   Result Value Ref Range    WBC 7.1 4.0 - 11 10*9/L    RBC Count 4.31 3.8 - 5.2 10*12/L    Hemoglobin 13.6 11.7 - 15.7 g/dL    Hematocrit 41.5 35.0 - 47.0 %    MCV 96.3 78 - 100 fL    MCH 31.6 26 - 33 pg    MCHC 32.8 31 - 36 g/dL    RDW 12.6 %    Platelet Count 300 150 - 375 10^9/L      EKG: appears normal, NSR, sinus bradycardia, normal axis, normal intervals, no acute ST/T changes c/w ischemia, no LVH by voltage criteria, unchanged from previous tracings    Revised Cardiac Risk Index (RCRI):  The patient has the following serious cardiovascular risks for perioperative complications:   - No serious cardiac risks = 0 points     RCRI Interpretation: 0 points: Class I (very low risk - 0.4% complication rate)    Signed Electronically by: Margie Albrecht PA-C  Copy of this evaluation report is provided to requesting physician.

## 2023-11-30 NOTE — NURSING NOTE
Chief Complaint   Patient presents with    Pre-Op Exam     Surgery/Procedure: Left hand thumb repair   Surgery Location: [facility]   Surgeon: Dr. De La Cruz  Surgery Date: 12/05/23

## 2023-12-08 ENCOUNTER — TRANSFERRED RECORDS (OUTPATIENT)
Dept: FAMILY MEDICINE | Facility: CLINIC | Age: 78
End: 2023-12-08

## 2023-12-19 ENCOUNTER — TRANSFERRED RECORDS (OUTPATIENT)
Dept: FAMILY MEDICINE | Facility: CLINIC | Age: 78
End: 2023-12-19

## 2024-01-04 DIAGNOSIS — E78.5 DYSLIPIDEMIA: Primary | ICD-10-CM

## 2024-01-04 LAB
ALT 1742-6: 8 U/L (ref 0–32)
CHOLEST SERPL-MCNC: 130 MG/DL (ref 0–199)
CHOLEST/HDLC SERPL: 3 {RATIO} (ref 0–5)
HDLC SERPL-MCNC: 44 MG/DL (ref 40–150)
LDLC SERPL CALC-MCNC: 55 MG/DL (ref 0–130)
TRIGL SERPL-MCNC: 155 MG/DL (ref 0–149)

## 2024-01-04 PROCEDURE — 80061 LIPID PANEL: CPT | Performed by: FAMILY MEDICINE

## 2024-01-04 PROCEDURE — 36415 COLL VENOUS BLD VENIPUNCTURE: CPT | Performed by: FAMILY MEDICINE

## 2024-01-04 PROCEDURE — 84460 ALANINE AMINO (ALT) (SGPT): CPT | Performed by: FAMILY MEDICINE

## 2024-01-05 ENCOUNTER — TELEPHONE (OUTPATIENT)
Dept: CARDIOLOGY | Facility: CLINIC | Age: 79
End: 2024-01-05
Payer: COMMERCIAL

## 2024-01-05 NOTE — RESULT ENCOUNTER NOTE
Results reviewed, please let the patient know that overall findings are reassuring, LDL has decreased to 55.  ALT is normal.  Triglycerides are borderline mildly elevated, this is primarily reflection of diet, recommend continued efforts to follow a generally Mediterranean type diet.  Follow up as previously planned, thanks!

## 2024-01-05 NOTE — TELEPHONE ENCOUNTER
----- Message from Aramis Rodrigues MD sent at 1/5/2024  7:19 AM CST -----  Results reviewed, please let the patient know that overall findings are reassuring, LDL has decreased to 55.  ALT is normal.  Triglycerides are borderline mildly elevated, this is primarily reflection of diet, recommend continued efforts to follow a generally Mediterranean type diet.  Follow up as previously planned, thanks!      Left a message for patient to call back.       Addendum 1330: Spoke to patient, reviewed labs and message from Dr Rodrigues. She verbalized understanding and will look into the Mediterranean diet more. Pt to follow up 10/2024.

## 2024-01-22 ENCOUNTER — TRANSFERRED RECORDS (OUTPATIENT)
Dept: FAMILY MEDICINE | Facility: CLINIC | Age: 79
End: 2024-01-22

## 2024-04-26 ENCOUNTER — OFFICE VISIT (OUTPATIENT)
Dept: FAMILY MEDICINE | Facility: CLINIC | Age: 79
End: 2024-04-26

## 2024-04-26 VITALS
SYSTOLIC BLOOD PRESSURE: 173 MMHG | WEIGHT: 150 LBS | BODY MASS INDEX: 30.24 KG/M2 | TEMPERATURE: 97.3 F | OXYGEN SATURATION: 97 % | HEIGHT: 59 IN | HEART RATE: 55 BPM | DIASTOLIC BLOOD PRESSURE: 74 MMHG

## 2024-04-26 DIAGNOSIS — R01.1 HEART MURMUR: Primary | ICD-10-CM

## 2024-04-26 DIAGNOSIS — M85.88 OSTEOPENIA OF OTHER SITE: ICD-10-CM

## 2024-04-26 DIAGNOSIS — I10 ESSENTIAL HYPERTENSION: ICD-10-CM

## 2024-04-26 LAB
ALBUMIN SERPL-MCNC: 3.9 G/DL (ref 3.6–5.1)
ALBUMIN/GLOB SERPL: 1.4 {RATIO} (ref 1–2.5)
ALP SERPL-CCNC: 54 U/L (ref 33–130)
ALT 1742-6: 11 U/L (ref 0–32)
AST 1920-8: 15 U/L (ref 0–35)
BILIRUB SERPL-MCNC: 0.7 MG/DL (ref 0.2–1.2)
BUN SERPL-MCNC: 11 MG/DL (ref 7–25)
BUN/CREATININE RATIO: 12 (ref 6–32)
CALCIUM SERPL-MCNC: 9.7 MG/DL (ref 8.6–10.3)
CHLORIDE SERPLBLD-SCNC: 101 MMOL/L (ref 98–110)
CO2 SERPL-SCNC: 29.6 MMOL/L (ref 20–32)
CREAT SERPL-MCNC: 0.92 MG/DL (ref 0.6–1.3)
GLOBULIN, CALCULATED - QUEST: 2.8 (ref 1.9–3.7)
GLUCOSE SERPL-MCNC: 91 MG/DL (ref 60–99)
POTASSIUM SERPL-SCNC: 4.28 MMOL/L (ref 3.5–5.3)
PROT SERPL-MCNC: 6.7 G/DL (ref 6.1–8.1)
SODIUM SERPL-SCNC: 136.8 MMOL/L (ref 135–146)

## 2024-04-26 PROCEDURE — 99213 OFFICE O/P EST LOW 20 MIN: CPT | Performed by: FAMILY MEDICINE

## 2024-04-26 PROCEDURE — 36415 COLL VENOUS BLD VENIPUNCTURE: CPT | Performed by: FAMILY MEDICINE

## 2024-04-26 PROCEDURE — 80053 COMPREHEN METABOLIC PANEL: CPT | Performed by: FAMILY MEDICINE

## 2024-04-26 PROCEDURE — G2211 COMPLEX E/M VISIT ADD ON: HCPCS | Performed by: FAMILY MEDICINE

## 2024-04-26 RX ORDER — PSEUDOEPHED/ACETAMINOPH/DIPHEN 30MG-500MG
TABLET ORAL
COMMUNITY
Start: 2023-12-04 | End: 2024-07-30

## 2024-04-26 RX ORDER — ALENDRONATE SODIUM 70 MG/1
70 TABLET ORAL
Qty: 12 TABLET | Refills: 1 | Status: SHIPPED | OUTPATIENT
Start: 2024-04-26

## 2024-04-26 RX ORDER — LISINOPRIL 40 MG/1
TABLET ORAL
Qty: 90 TABLET | Refills: 1 | Status: SHIPPED | OUTPATIENT
Start: 2024-04-26

## 2024-04-26 NOTE — PROGRESS NOTES
"Assessment & Plan   Problem List Items Addressed This Visit          Circulatory    Essential hypertension    Relevant Medications    lisinopril (ZESTRIL) 40 MG tablet    Other Relevant Orders    VENOUS COLLECTION (Completed)    Comprehensive Metobolic Panel (BFP)    Heart murmur - Primary       Musculoskeletal and Integumentary    Osteopenia of other site    Relevant Medications    alendronate (FOSAMAX) 70 MG tablet    acetaminophen (TYLENOL) 500 MG tablet      1. Essential hypertension  She is doing well on the medications, refilled. Check labs.  - lisinopril (ZESTRIL) 40 MG tablet; TAKE 1 TABLET BY MOUTH EVERY DAY  Dispense: 90 tablet; Refill: 1  - VENOUS COLLECTION  - Comprehensive Metobolic Panel (BFP)    2. Osteopenia of other site  Refilled.  - alendronate (FOSAMAX) 70 MG tablet; Take 1 tablet (70 mg) by mouth every 7 days  Dispense: 12 tablet; Refill: 1    3. Heart murmur    We manage her chronic medical care.              BMI  Estimated body mass index is 30.82 kg/m  as calculated from the following:    Height as of this encounter: 1.486 m (4' 10.5\").    Weight as of this encounter: 68 kg (150 lb).         FUTURE APPOINTMENTS:       - Follow-up visit in 6 months.    No follow-ups on file.    Shani Argueta MD  [facility]    Subjective     Nursing Notes:   Paige Jacobson MA  4/26/2024 12:57 PM  Signed  Chief Complaint   Patient presents with    Recheck Medication     Pt here for a medication recheck and refill         Concha De Jesus is a 78 year old female who presents to clinic today for the following health issues   HPI     Here to followup on her medications, she is doing well. Due for refills on her blood pressure and fosamax. She works with cardiology also.        Review of Systems   Constitutional, HEENT, cardiovascular, pulmonary, gi and gu systems are negative, except as otherwise noted.      Objective    BP (!) 173/74 (BP Location: Left arm, Patient Position: Sitting, Cuff " "Size: Adult Regular)   Pulse 55   Temp 97.3  F (36.3  C) (Temporal)   Ht 1.486 m (4' 10.5\")   Wt 68 kg (150 lb)   LMP  (LMP Unknown)   SpO2 97%   BMI 30.82 kg/m    Body mass index is 30.82 kg/m .  Physical Exam   GENERAL: alert and no distress  RESP: lungs clear to auscultation - no rales, rhonchi or wheezes  CV: regular rate and rhythm, normal S1 S2, no S3 or S4,+ systolic murmur  MS: no gross musculoskeletal defects noted, no edema  NEURO: Normal strength and tone, mentation intact and speech normal  PSYCH: mentation appears normal, affect normal/bright    No results found for any visits on 04/26/24.      "

## 2024-04-26 NOTE — NURSING NOTE
Chief Complaint   Patient presents with    Recheck Medication     Pt here for a medication recheck and refill

## 2024-06-17 PROBLEM — Z76.89 HEALTH CARE HOME: Status: RESOLVED | Noted: 2021-05-05 | Resolved: 2024-06-17

## 2024-07-15 ENCOUNTER — TELEPHONE (OUTPATIENT)
Dept: FAMILY MEDICINE | Facility: CLINIC | Age: 79
End: 2024-07-15

## 2024-07-15 NOTE — TELEPHONE ENCOUNTER
----- Message from Shazia VELOZ sent at 8/2/2023 12:13 PM CDT -----  Follow up lung cancer screen   ----- Message -----  From: Shani Argueta MD  Sent: 8/2/2023   9:14 AM CDT  To: Shazia London    Tickle file 12 mo lung cancer screen

## 2024-07-26 NOTE — PROGRESS NOTES
"Assessment & Plan   Problem List Items Addressed This Visit    None  Visit Diagnoses       Personal history of tobacco use    -  Primary    Relevant Orders    Prof fee: Shared Decision Making for Lung Cancer Screening (Completed)    CT Chest Lung Cancer Scrn Low Dose wo    Radiology Referral (Affiliate Use Only)           1. Personal history of tobacco use  Discussed risks/benefits. Referral done. Encouraged to quit smoking.  - Prof fee: Shared Decision Making for Lung Cancer Screening  - CT Chest Lung Cancer Scrn Low Dose wo; Future  - Radiology Referral (Affiliate Use Only)            BMI  Estimated body mass index is 29.58 kg/m  as calculated from the following:    Height as of 4/26/24: 1.486 m (4' 10.5\").    Weight as of this encounter: 65.3 kg (144 lb).         FUTURE APPOINTMENTS:       - Follow-up visit for medication check in October.    No follow-ups on file.    Shani Argueta MD  [facility]    Subjective     Nursing Notes:   Lo Munguia, [facility]  7/30/2024  7:47 AM  Signed  Chief Complaint   Patient presents with    Orders     Order for low dose CT scan      Pre-visit Screening:  Immunizations:  up to date  Colonoscopy:  is up to date  Mammogram: is up to date  Asthma Action Test/Plan:  NA  PHQ9:  NA  GAD7:  NA  Questioned patient about current smoking habits Pt. smokes   Ok to leave detailed message on voice mail for today's visit only yes, phone # [phone]       Concha De Jesus is a 79 year old female who presents to clinic today for the following health issues   HPI     Here to discuss lung cancer screen. She smokes between 1/2-1 PPD and is wanting to quit. Knows that she can only quit cold turkey. Has smoked for 60 years.         Review of Systems   Constitutional, HEENT, cardiovascular, pulmonary, gi and gu systems are negative, except as otherwise noted.      Objective    BP (!) 144/82 (BP Location: Right arm, Patient Position: Sitting, Cuff Size: Adult Large)   Pulse 60   " Temp 98  F (36.7  C) (Temporal)   Wt 65.3 kg (144 lb)   LMP  (LMP Unknown)   SpO2 97%   BMI 29.58 kg/m    Body mass index is 29.58 kg/m .  Physical Exam   GENERAL: alert and no distress  MS: no gross musculoskeletal defects noted, no edema  PSYCH: mentation appears normal, affect normal/bright        Lung Cancer Screening Shared Decision Making Visit     Concha De Jesus, a 79 year old female, is eligible for lung cancer screening    History   Smoking Status    Every Day   Smokeless Tobacco    Never       I have discussed with patient the risks and benefits of screening for lung cancer with low-dose CT.     The risks include:    radiation exposure: one low dose chest CT has as much ionizing radiation as about 15 chest x-rays, or 6 months of background radiation living in [place]      false positives: most findings/nodules are NOT cancer, but some might still require additional diagnostic evaluation, including biopsy    over-diagnosis: some slow growing cancers that might never have been clinically significant will be detected and treated unnecessarily     The benefit of early detection of lung cancer is contingent upon adherence to annual screening or more frequent follow up if indicated.     Furthermore, to benefit from screening, Concha must be willing and able to undergo diagnostic procedures, if indicated. Although no specific guide is available for determining severity of comorbidities, it is reasonable to withhold screening in patients who have greater mortality risk from other diseases.     We did discuss that the best way to prevent lung cancer is to not smoke.    Some patients may value a numeric estimation of lung cancer risk when evaluating if lung cancer screening is right for them, here is one calculator:    ShouldIScreen

## 2024-07-30 ENCOUNTER — OFFICE VISIT (OUTPATIENT)
Dept: FAMILY MEDICINE | Facility: CLINIC | Age: 79
End: 2024-07-30

## 2024-07-30 VITALS
TEMPERATURE: 98 F | BODY MASS INDEX: 29.58 KG/M2 | SYSTOLIC BLOOD PRESSURE: 144 MMHG | DIASTOLIC BLOOD PRESSURE: 82 MMHG | WEIGHT: 144 LBS | HEART RATE: 60 BPM | OXYGEN SATURATION: 97 %

## 2024-07-30 DIAGNOSIS — Z87.891 PERSONAL HISTORY OF TOBACCO USE: Primary | ICD-10-CM

## 2024-07-30 PROCEDURE — 99213 OFFICE O/P EST LOW 20 MIN: CPT | Performed by: FAMILY MEDICINE

## 2024-07-30 PROCEDURE — G0296 VISIT TO DETERM LDCT ELIG: HCPCS | Performed by: FAMILY MEDICINE

## 2024-07-30 NOTE — NURSING NOTE
Chief Complaint   Patient presents with    Orders     Order for low dose CT scan      Pre-visit Screening:  Immunizations:  up to date  Colonoscopy:  is up to date  Mammogram: is up to date  Asthma Action Test/Plan:  NA  PHQ9:  NA  GAD7:  NA  Questioned patient about current smoking habits Pt. smokes   Ok to leave detailed message on voice mail for today's visit only yes, phone # [phone]

## 2024-07-30 NOTE — PATIENT INSTRUCTIONS
Lung Cancer Screening   Frequently Asked Questions  If you are at high-risk for lung cancer, getting screened with low-dose computed tomography (LDCT) every year can help save your life. This handout offers answers to some of the most common questions about lung cancer screening. If you have other questions, please call [phone] ([phone]).     What is it?  Lung cancer screening uses special X-ray technology to create an image of your lung tissue. The exam is quick and easy and takes less than 10 seconds. We don t give you any medicine or use any needles. You can eat before and after the exam. You don t need to change your clothes as long as the clothing on your chest doesn t contain metal. But, you do need to be able to hold your breath for at least 6 seconds during the exam.    What is the goal of lung cancer screening?  The goal of lung cancer screening is to save lives. Many times, lung cancer is not found until a person starts having physical symptoms. Lung cancer screening can help detect lung cancer in the earliest stages when it may be easier to treat.    Who should be screened for lung cancer?  We suggest lung cancer screening for anyone who is at high-risk for lung cancer. You are in the high-risk group if you:      are between the ages of 55 and 79, and    have smoked at least 1 pack of cigarettes a day for 20 or more years, and    still smoke or have quit within the past 15 years.    However, if you have a new cough or shortness of breath, you should talk to your doctor before being screened.    Why does it matter if I have symptoms?  Certain symptoms can be a sign that you have a condition in your lungs that should be checked and treated by your doctor. These symptoms include fever, chest pain, a new or changing cough, shortness of breath that you have never felt before, coughing up blood or unexplained weight loss. Having any of these symptoms can greatly affect the results of lung  cancer screening.       Should all smokers get an LDCT lung cancer screening exam?  It depends. Lung cancer screening is for a very specific group of men and women who have a history of heavy smoking over a long period of time (see  Who should be screened for lung cancer  above).  I am in the high-risk group, but have been diagnosed with cancer in the past. Is LDCT lung cancer screening right for me?  In some cases, you should not have LDCT lung screening, such as when your doctor is already following your cancer with CT scan studies. Your doctor will help you decide if LDCT lung screening is right for you.  Do I need to have a screening exam every year?  Yes. If you are in the high-risk group described earlier, you should get an LDCT lung cancer screening exam every year until you are 79, or are no longer willing or able to undergo screening and possible procedures to diagnose and treat lung cancer.  How effective is LDCT at preventing death from lung cancer?  Studies have shown that LDCT lung cancer screening can lower the risk of death from lung cancer by 20 percent in people who are at high-risk.  What are the risks?  There are some risks and limitations of LDCT lung cancer screening. We want to make sure you understand the risks and benefits, so please let us know if you have any questions. Your doctor may want to talk with you more about these risks.    Radiation exposure: As with any exam that uses radiation, there is a very small increased risk of cancer. The amount of radiation in LDCT is small--about the same amount a person would get from a mammogram. Your doctor orders the exam when he or she feels the potential benefits outweigh the risks.    False negatives: No test is perfect, including LDCT. It is possible that you may have a medical condition, including lung cancer, that is not found during your exam. This is called a false negative result.    False positives and more testing: LDCT very often finds  something in the lung that could be cancer, but in fact is not. This is called a false positive result. False positive tests often cause anxiety. To make sure these findings are not cancer, you may need to have more tests. These tests will be done only if you give us permission. Sometimes patients need a treatment that can have side effects, such as a biopsy. For more information on false positives, see  What can I expect from the results?     Findings not related to lung cancer: Your LDCT exam also takes pictures of areas of your body next to your lungs. In a very small number of cases, the CT scan will show an abnormal finding in one of these areas, such as your kidneys, adrenal glands, liver or thyroid. This finding may not be serious, but you may need more tests. Your doctor can help you decide what other tests you may need, if any.  What can I expect from the results?  About 1 out of 4 LDCT exams will find something that may need more tests. Most of the time, these findings are lung nodules. Lung nodules are very small collections of tissue in the lung. These nodules are very common, and the vast majority--more than 97 percent--are not cancer (benign). Most are normal lymph nodes or small areas of scarring from past infections.  But, if a small lung nodule is found to be cancer, the cancer can be cured more than 90 percent of the time. To know if the nodule is cancer, we may need to get more images before your next yearly screening exam. If the nodule has suspicious features (for example, it is large, has an odd shape or grows over time), we will refer you to a specialist for further testing.  Will my doctor also get the results?  Yes. Your doctor will get a copy of your results.  Is it okay to keep smoking now that there s a cancer screening exam?  No. Tobacco is one of the strongest cancer-causing agents. It causes not only lung cancer, but other cancers and cardiovascular (heart) diseases as well. The damage  caused by smoking builds over time. This means that the longer you smoke, the higher your risk of disease. While it is never too late to quit, the sooner you quit, the better.  Where can I find help to quit smoking?  The best way to prevent lung cancer is to stop smoking. If you have already quit smoking, congratulations and keep it up! For help on quitting smoking, please call [employer] at 7-091-QUITNOW (1-575.551.1765) or the American Cancer Society at 1-257.465.6283 to find local resources near you.  One-on-one health coaching:  If you d prefer to work individually with a health care provider on tobacco cessation, we offer:      Medication Therapy Management:  Our specially trained pharmacists work closely with you and your doctor to help you quit smoking.  Call [phone] or [phone] (toll free).

## 2024-09-11 ENCOUNTER — MYC REFILL (OUTPATIENT)
Dept: CARDIOLOGY | Facility: CLINIC | Age: 79
End: 2024-09-11
Payer: COMMERCIAL

## 2024-09-11 DIAGNOSIS — I25.10 CORONARY ARTERY CALCIFICATION: ICD-10-CM

## 2024-09-11 RX ORDER — ROSUVASTATIN CALCIUM 40 MG/1
40 TABLET, COATED ORAL AT BEDTIME
Qty: 90 TABLET | Refills: 0 | Status: SHIPPED | OUTPATIENT
Start: 2024-09-11

## 2024-09-11 NOTE — TELEPHONE ENCOUNTER
[facility] Cardiology Refill Guideline reviewed.  Medication meets criteria for refill. Follow up is scheduled for October.

## 2024-10-03 ENCOUNTER — TELEPHONE (OUTPATIENT)
Dept: CARDIOLOGY | Facility: CLINIC | Age: 79
End: 2024-10-03
Payer: COMMERCIAL

## 2024-10-03 NOTE — TELEPHONE ENCOUNTER
1st attempt- Left voicemail for the patient to call back and schedule the following:    Appointment type:  Return Cardiology  Provider:  Dr Rodrigues  Return date:  1st available  Additional appointment(s) needed:  -  Additonal Notes:  reschedule from 10/10/24 due to orphaned  Specialty phone number: [phone]

## 2024-10-14 NOTE — PROGRESS NOTES
"Assessment & Plan   Problem List Items Addressed This Visit       Essential hypertension    Osteopenia of other site     Other Visit Diagnoses       Medicare annual wellness visit, subsequent    -  Primary           1. Medicare annual wellness visit, subsequent  Completed.    2. Osteopenia of other site  Refilled.  - alendronate (FOSAMAX) 70 MG tablet; Take 1 tablet (70 mg) by mouth every 7 days.  Dispense: 12 tablet; Refill: 1    3. Essential hypertension  Controlled on medications, refilled.  - lisinopril (ZESTRIL) 40 MG tablet; TAKE 1 TABLET BY MOUTH EVERY DAY  Dispense: 90 tablet; Refill: 1  - VENOUS COLLECTION  - BASIC METABOLIC PANEL ([facility])            Nicotine/Tobacco Cessation  She reports that she has been smoking. She has never been exposed to tobacco smoke. She has never used smokeless tobacco.  Nicotine/Tobacco Cessation Plan        BMI  Estimated body mass index is 29.17 kg/m  as calculated from the following:    Height as of this encounter: 1.486 m (4' 10.5\").    Weight as of this encounter: 64.4 kg (142 lb).         FUTURE APPOINTMENTS:       - Follow-up visit in 6 months.     No follow-ups on file.    Shani Argueta MD  [facility]    Subjective     Nursing Notes:   Lo Munguia, [facility]  10/22/2024  8:48 AM  Signed  Chief Complaint   Patient presents with    Recheck Medication     Fasting today, refill medications     Pre-visit Screening:  Immunizations:  up to date  Colonoscopy:  is up to date  Mammogram: is up to date  Asthma Action Test/Plan:  NA  PHQ9:  NA  GAD7:  NA  Questioned patient about current smoking habits Pt. Smokes.  Ok to leave detailed message on voice mail for today's visit only Yes, phone # [phone]       Concha De Jesus is a 79 year old female who presents to clinic today for the following health issues   HPI     Here with her sister. Here for medication check and labs. She is doing well on blood pressure and osteoporosis. She has been on fosamax for about " "a year.        Review of Systems   Constitutional, HEENT, cardiovascular, pulmonary, gi and gu systems are negative, except as otherwise noted.      Objective    /82 (BP Location: Right arm, Patient Position: Sitting, Cuff Size: Adult Large)   Pulse 56   Temp 97.9  F (36.6  C) (Temporal)   Ht 1.486 m (4' 10.5\")   Wt 64.4 kg (142 lb)   LMP  (LMP Unknown)   SpO2 97%   BMI 29.17 kg/m    Body mass index is 29.17 kg/m .  Physical Exam   GENERAL: alert and no distress  RESP: lungs clear to auscultation - no rales, rhonchi or wheezes  CV: regular rate and rhythm, normal S1 S2, no S3 or S4, no murmur, click or rub, no peripheral edema  MS: no gross musculoskeletal defects noted, no edema  PSYCH: mentation appears normal, affect normal/bright    No results found for any visits on 10/22/24.      "

## 2024-10-22 ENCOUNTER — OFFICE VISIT (OUTPATIENT)
Dept: FAMILY MEDICINE | Facility: CLINIC | Age: 79
End: 2024-10-22

## 2024-10-22 VITALS
DIASTOLIC BLOOD PRESSURE: 82 MMHG | WEIGHT: 142 LBS | TEMPERATURE: 97.9 F | OXYGEN SATURATION: 97 % | HEIGHT: 59 IN | BODY MASS INDEX: 28.63 KG/M2 | HEART RATE: 56 BPM | SYSTOLIC BLOOD PRESSURE: 138 MMHG

## 2024-10-22 DIAGNOSIS — M85.88 OSTEOPENIA OF OTHER SITE: ICD-10-CM

## 2024-10-22 DIAGNOSIS — I10 ESSENTIAL HYPERTENSION: ICD-10-CM

## 2024-10-22 DIAGNOSIS — Z00.00 MEDICARE ANNUAL WELLNESS VISIT, SUBSEQUENT: Primary | ICD-10-CM

## 2024-10-22 PROCEDURE — G0439 PPPS, SUBSEQ VISIT: HCPCS | Performed by: FAMILY MEDICINE

## 2024-10-22 PROCEDURE — 99213 OFFICE O/P EST LOW 20 MIN: CPT | Mod: 25 | Performed by: FAMILY MEDICINE

## 2024-10-22 PROCEDURE — 36415 COLL VENOUS BLD VENIPUNCTURE: CPT | Performed by: FAMILY MEDICINE

## 2024-10-22 RX ORDER — LISINOPRIL 40 MG/1
TABLET ORAL
Qty: 90 TABLET | Refills: 1 | Status: SHIPPED | OUTPATIENT
Start: 2024-10-22

## 2024-10-22 RX ORDER — ALENDRONATE SODIUM 70 MG/1
70 TABLET ORAL
Qty: 12 TABLET | Refills: 1 | Status: SHIPPED | OUTPATIENT
Start: 2024-10-22

## 2024-10-22 NOTE — NURSING NOTE
Chief Complaint   Patient presents with    Recheck Medication     Fasting today, refill medications     Pre-visit Screening:  Immunizations:  up to date  Colonoscopy:  is up to date  Mammogram: is up to date  Asthma Action Test/Plan:  NA  PHQ9:  NA  GAD7:  NA  Questioned patient about current smoking habits Pt. Smokes.  Ok to leave detailed message on voice mail for today's visit only Yes, phone # [phone]

## 2024-10-22 NOTE — PATIENT INSTRUCTIONS
Health Maintenance   Topic Date Due    LUNG CANCER SCREENING  08/01/2024    MEDICARE ANNUAL WELLNESS VISIT  10/09/2024    LIPID  01/04/2025    BMP  04/26/2025    DEXA  10/17/2025    NICOTINE/TOBACCO CESSATION COUNSELING Q 1 YR  10/22/2025    FALL RISK ASSESSMENT  10/22/2025    GLUCOSE  04/26/2027    ADVANCE CARE PLANNING  02/15/2028    DTAP/TDAP/TD IMMUNIZATION (3 - Td or Tdap) 11/05/2028    HEPATITIS C SCREENING  Completed    PHQ-2 (once per calendar year)  Completed    INFLUENZA VACCINE  Completed    Pneumococcal Vaccine: 65+ Years  Completed    ZOSTER IMMUNIZATION  Completed    RSV VACCINE  Completed    COVID-19 Vaccine  Completed    HPV IMMUNIZATION  Aged Out    MENINGITIS IMMUNIZATION  Aged Out    RSV MONOCLONAL ANTIBODY  Aged Out      
Patient/Caregiver provided printed discharge information.

## 2024-10-22 NOTE — PROGRESS NOTES
Concha De Jesus is a 79 year old female who presents for Medicare Annual Wellness Visit.    Current providers caring for this patient include:  Patient Care Team:  Shani Argueta MD as PCP - General (Family Medicine)  Shani Argueta MD as Assigned PCP  Aramis Rodrigues MD as Assigned Heart and Vascular Provider    Complete Medical and Social history reviewed with patient, outlined below.    Patient Active Problem List   Diagnosis    ACP (advance care planning)    Essential hypertension    Other hyperlipidemia    History of melanoma    Abnormal result of other cardiovascular function study    Coronary artery disease involving native coronary artery of native heart without angina pectoris    Kidney stone    Family history of ischemic heart disease    Heart murmur    Personal history of tobacco use, presenting hazards to health    Acquired cystic kidney disease    Squamous cell carcinoma in situ    Osteopenia of other site       No past medical history on file.    No past surgical history on file.    No family history on file.    Social History     Tobacco Use    Smoking status: Every Day     Passive exposure: Never    Smokeless tobacco: Never   Substance Use Topics    Alcohol use: Not Currently       Diet: regular  Physical Activity: patient exercises 5 times weekly, walks and golfing   Depression Screen:    Over the past 2 weeks, patient has felt down, depressed, or hopeless:  No    Over the past 2 weeks, patient has felt little interest or pleasure in doing things: No    Functional ability/Safety screen:  Up and go test (able to get up and walk longer than 30 seconds): Passed  Patient needs assistance with: nothing  Patient's home has the following possible safety concerns: none identified  Patient has concerns about her hearing:  No  Cognitive Screen  Patient repeats three objects (ball, flag, tree)      Clock drawing test:   NORMAL  Recalls three objects after 3 minutes (ball,flag,tree):  recalls 2 objects  "(2 points)    Physical Exam:  /82 (BP Location: Right arm, Patient Position: Sitting, Cuff Size: Adult Large)   Pulse 56   Temp 97.9  F (36.6  C) (Temporal)   Ht 1.486 m (4' 10.5\")   Wt 64.4 kg (142 lb)   LMP  (LMP Unknown)   SpO2 97%   BMI 29.17 kg/m     Body mass index is 29.17 kg/m .        Health Maintenance   Topic Date Due    LUNG CANCER SCREENING  08/01/2024    MEDICARE ANNUAL WELLNESS VISIT  10/09/2024    LIPID  01/04/2025    BMP  04/26/2025    DEXA  10/17/2025    NICOTINE/TOBACCO CESSATION COUNSELING Q 1 YR  10/22/2025    FALL RISK ASSESSMENT  10/22/2025    GLUCOSE  04/26/2027    ADVANCE CARE PLANNING  02/15/2028    DTAP/TDAP/TD IMMUNIZATION (3 - Td or Tdap) 11/05/2028    HEPATITIS C SCREENING  Completed    PHQ-2 (once per calendar year)  Completed    INFLUENZA VACCINE  Completed    Pneumococcal Vaccine: 65+ Years  Completed    ZOSTER IMMUNIZATION  Completed    RSV VACCINE  Completed    COVID-19 Vaccine  Completed    HPV IMMUNIZATION  Aged Out    MENINGITIS IMMUNIZATION  Aged Out    RSV MONOCLONAL ANTIBODY  Aged Out         End of Life Planning:   Patient currently has an advanced directive:     Education/Counseling:   Based on review of the above information, the following items were addressed:      Discussed healthy diet and regular exercise    Appropriate preventive services were discussed with this patient, including applicable screening as appropriate for cardiovascular disease, diabetes, osteopenia/osteoporosis, and glaucoma.  As appropriate for age/gender, discussed screening for colorectal cancer, prostate cancer, breast cancer, and cervical cancer.   Checklist reviewing preventive services available has been given to the patient.     reviewed and is up to date     "

## 2024-10-23 LAB
BUN SERPL-MCNC: 16 MG/DL (ref 7–25)
BUN/CREATININE RATIO: 14 (CALC) (ref 6–22)
CALCIUM SERPL-MCNC: 10 MG/DL (ref 8.6–10.4)
CHLORIDE SERPLBLD-SCNC: 105 MMOL/L (ref 98–110)
CO2 SERPL-SCNC: 27 MMOL/L (ref 20–32)
CREAT SERPL-MCNC: 1.11 MG/DL (ref 0.6–1)
EGFR (QUEST): 51 ML/MIN/1.73M2
GLUCOSE - QUEST: 96 MG/DL (ref 65–99)
POTASSIUM SERPL-SCNC: 4.5 MMOL/L (ref 3.5–5.3)
SODIUM SERPL-SCNC: 139 MMOL/L (ref 135–146)

## 2024-11-04 ENCOUNTER — TRANSFERRED RECORDS (OUTPATIENT)
Dept: FAMILY MEDICINE | Facility: CLINIC | Age: 79
End: 2024-11-04

## 2024-12-16 ENCOUNTER — TRANSFERRED RECORDS (OUTPATIENT)
Dept: FAMILY MEDICINE | Facility: CLINIC | Age: 79
End: 2024-12-16

## 2025-01-20 ENCOUNTER — OFFICE VISIT (OUTPATIENT)
Dept: CARDIOLOGY | Facility: CLINIC | Age: 80
End: 2025-01-20
Payer: COMMERCIAL

## 2025-01-20 VITALS
HEIGHT: 59 IN | DIASTOLIC BLOOD PRESSURE: 90 MMHG | SYSTOLIC BLOOD PRESSURE: 190 MMHG | BODY MASS INDEX: 29.39 KG/M2 | OXYGEN SATURATION: 97 % | HEART RATE: 65 BPM | WEIGHT: 145.8 LBS

## 2025-01-20 DIAGNOSIS — I35.1 MILD AORTIC INSUFFICIENCY: Primary | ICD-10-CM

## 2025-01-20 DIAGNOSIS — I25.10 CALCIFIC CORONARY ARTERIOSCLEROSIS: ICD-10-CM

## 2025-01-20 DIAGNOSIS — I25.10 CORONARY ARTERY CALCIFICATION: ICD-10-CM

## 2025-01-20 DIAGNOSIS — I10 ESSENTIAL HYPERTENSION: ICD-10-CM

## 2025-01-20 DIAGNOSIS — R01.1 SYSTOLIC EJECTION MURMUR: ICD-10-CM

## 2025-01-20 PROBLEM — N20.0 KIDNEY STONE: Status: RESOLVED | Noted: 2021-09-15 | Resolved: 2025-01-20

## 2025-01-20 PROCEDURE — 99214 OFFICE O/P EST MOD 30 MIN: CPT | Performed by: INTERNAL MEDICINE

## 2025-01-20 RX ORDER — AMLODIPINE BESYLATE 2.5 MG/1
2.5 TABLET ORAL DAILY
Qty: 90 TABLET | Refills: 3 | Status: SHIPPED | OUTPATIENT
Start: 2025-01-20

## 2025-01-20 RX ORDER — ROSUVASTATIN CALCIUM 40 MG/1
40 TABLET, COATED ORAL AT BEDTIME
Qty: 90 TABLET | Refills: 3 | Status: SHIPPED | OUTPATIENT
Start: 2025-01-20

## 2025-01-20 RX ORDER — NITROGLYCERIN 0.4 MG/1
TABLET SUBLINGUAL
Qty: 20 TABLET | Refills: 3 | Status: SHIPPED | OUTPATIENT
Start: 2025-01-20

## 2025-01-20 RX ORDER — LISINOPRIL 20 MG/1
20 TABLET ORAL 2 TIMES DAILY
Qty: 180 TABLET | Refills: 3 | Status: SHIPPED | OUTPATIENT
Start: 2025-01-20

## 2025-01-20 NOTE — PATIENT INSTRUCTIONS
January 20, 2025    Thank you for allowing our Cardiology team to participate in your care.     Please note the following changes to your heart treatment plan:     Medication changes:   - start amlodipine 2.5 mg every morning  - stop lisinopril 40mg daily    - start lisinopril 20 mg twice daily    - monitor BPs at home, call our team if BPs are consistently >130/80 mmHg    Tests to be done:  - TTE (heart ultrasound)    Follow up:  - Follow up in 1 year, or sooner as needed      For scheduling, please call [phone]      Please contact our team through [employer] or our Nurse Team Voicemail service [phone] for questions or concerns.     General Clinic [phone]     If you are having a medical emergency, please call [phone].     Sincerely,    Aramis Rodrigues MD, FACC  Cardiology    [facility]

## 2025-01-20 NOTE — LETTER
1/20/2025    Shani Argueta MD  [address]  [facility] [address]    RE: Concha De Jesus       Dear Colleague,     I had the pleasure of seeing Concha De Jesus in the [facility].      Cardiology Clinic Progress Note:    January 20, 2025   Patient Name: Concha De Jesus  Patient MRN: [id number]     Consult indication: CAC    HPI:    I had the opportunity to see patient Concha De Jesus in cardiology clinic for a follow up visit. Patient is followed by our colleague Shani Argueta MD with Primary Care.     As you know patient is a pleasant 79-year-old female with a past medical history significant for hypertension, dyslipidemia, calcific coronary artery disease, longstanding history of smoking, who presents for follow-up.    I had seen patient in clinic for consultation 10/5/2023.  At that time she had recently been assessed with an exercise stress echocardiogram that was negative for evidence of inducible ischemia.  LVEF 60 to 65%.  Calcific aortic valve without stenosis, trace aortic insufficiency.  CT lung cancer screening study 7/2023 demonstrated aortic calcifications and severe coronary artery calcifications.    Overall patient reports that she has been doing well.  She continues to exercise at the gym several times a week, walking around the track.  She denies any chest pain, chest pressure, abnormal shortness of breath.  She does continue to smoke about half a pack of cigarettes per day.  Blood pressure today in clinic was markedly elevated at 190/90 mmHg, however has been lower in the past.  She does not check her blood pressure regularly at home.  Otherwise no specific complaints/concerns, denies symptoms of orthopnea/PND, abnormal lower extremity swelling.    She is accompanied today by her sister, whom I will also be seeing in clinic in the future with long term of my partner Dr. Palma.    Assessment and Plan/Recommendations:    # Coronary artery calcifications on CT lung  cancer screening scan.  No angina. Exercise stress echocardiogram from [facility] 2/24/2023 negative for ischemia or infarction, normal resting LVEF 60-65%  # HTN, BP elevated  # HL, on statin  # Smoking, longstanding  # Mild AI, aortic sclerosis     - Continue rosuvastatin 40 mg nightly  - Continue aspirin 81 mg daily  - Will change lisinopril 40 mg daily to 20 mg twice daily  - Will add amlodipine 2.5 mg daily, cautioned on possible side effects  - Advised to monitor blood pressures at home and to contact our team if levels are consistently over 130/80 mmHg  - Smoking cessation encouraged  - Sublingual nitroglycerin as needed  - Encouraged continued healthy lifestyle habits  - TTE  - Follow-up in 1 year per patient preference, or sooner as needed    Thank you for allowing our team to participate in the care of Concha De Jesus.  Please do not hesitate to call or page me with any questions or concerns.    Sincerely,     Aramis Rodrigues MD, [facility]  Cardiology  Text Page   January 20, 2025    cc  Shani Argueta MD  [address] [place] [address]    Voice recognition software utilized.       Past Medical History:     Past Medical History:   Diagnosis Date     Coronary artery disease involving native coronary artery of native heart without angina pectoris 09/15/2021     Essential hypertension 05/05/2021        Past Surgical History:   No past surgical history on file.    Medications (outpatient):  Current Outpatient Medications   Medication Sig Dispense Refill     alendronate (FOSAMAX) 70 MG tablet Take 1 tablet (70 mg) by mouth every 7 days. 12 tablet 1     amLODIPine (NORVASC) 2.5 MG tablet Take 1 tablet (2.5 mg) by mouth daily. 90 tablet 3     aspirin 81 MG EC tablet Take 81 mg by mouth daily       calcium carbonate (OS-BIBI) 1500 (600 Ca) MG tablet 2 times daily.       Cholecalciferol (VITAMIN D3) 50 MCG (2000 UT) CAPS        lisinopril (ZESTRIL) 20 MG tablet Take 1 tablet (20 mg) by mouth 2 times  "daily. TAKE 1 TABLET BY MOUTH EVERY  tablet 3     nitroGLYcerin (NITROSTAT) 0.4 MG sublingual tablet For chest pain place 1 tablet under the tongue every 5 minutes for 3 doses. If symptoms persist 5 minutes after 1st dose call 911. 20 tablet 3     rosuvastatin (CRESTOR) 40 MG tablet Take 1 tablet (40 mg) by mouth at bedtime. 90 tablet 3       Allergies:  No Known Allergies    Social History:   History   Drug Use Unknown      History   Smoking Status     Every Day     Types: Cigarettes   Smokeless Tobacco     Never     Social History    Substance and Sexual Activity      Alcohol use: Not Currently       Family History:  No family history on file.    Review of Systems:   A complete review of systems was negative except as mentioned in the History of Present Illness.     Objective & Physical Exam:  BP (!) 190/90 (BP Location: Right arm, Patient Position: Sitting, Cuff Size: Adult Regular)   Pulse 65   Ht 1.486 m (4' 10.5\")   Wt 66.1 kg (145 lb 12.8 oz)   LMP  (LMP Unknown)   SpO2 97%   BMI 29.95 kg/m    Wt Readings from Last 2 Encounters:   01/20/25 66.1 kg (145 lb 12.8 oz)   10/22/24 64.4 kg (142 lb)     Body mass index is 29.95 kg/m .   Body surface area is 1.65 meters squared.    Constitutional: appears stated age, in no apparent distress, appears to be well nourished  Pulmonary: clear to auscultation bilaterally, no wheezes, no rales, no increased work of breathing  Cardiovascular: JVP normal, regular rate, regular rhythm, 2/6 LAURIE at the RUSB, no lower extremity edema    Data reviewed:  Lab Results   Component Value Date    WBC 7.1 11/30/2023    RBC 4.31 11/30/2023    HGB 13.6 11/30/2023    HCT 41.5 11/30/2023    MCV 96.3 11/30/2023    MCH 31.6 11/30/2023    MCHC 32.8 11/30/2023    RDW 12.6 11/30/2023     11/30/2023     Sodium   Date Value Ref Range Status   10/22/2024 139 135 - 146 mmol/L Final     Potassium   Date Value Ref Range Status   10/22/2024 4.5 3.5 - 5.3 mmol/L Final     Chloride " "  Date Value Ref Range Status   10/22/2024 105 98 - 110 mmol/L Final     Carbon Dioxide   Date Value Ref Range Status   10/22/2024 27 20 - 32 mmol/L Final     Glucose   Date Value Ref Range Status   10/22/2024 96 65 - 99 mg/dL Final     Comment:                   Fasting reference interval        04/26/2024 91 60 - 99 mg/dL Final     Urea Nitrogen   Date Value Ref Range Status   10/22/2024 16 7 - 25 mg/dL Final     BUN/Creatinine Ratio   Date Value Ref Range Status   10/22/2024 14 6 - 22 (calc) Final     Creatinine   Date Value Ref Range Status   10/22/2024 1.11 (H) 0.60 - 1.00 mg/dL Final     Calcium   Date Value Ref Range Status   10/22/2024 10.0 8.6 - 10.4 mg/dL Final     Bilirubin Total   Date Value Ref Range Status   04/26/2024 0.7 0.2 - 1.2 mg/dL Final     Alkaline Phosphatase   Date Value Ref Range Status   04/26/2024 54 33 - 130 U/L Final     Recent Labs   Lab Test 01/04/24  1131 10/04/22  0000   CHOL 130 146   HDL 44 53   LDL 55 73   TRIG 155* 102   CHOLHDLRATIO 3 3      No results found for: \"A1C\"     No results found for this or any previous visit (from the past 4320 hours).     Thank you for allowing me to participate in the care of your patient.      Sincerely,     Aramis Rodrigues MD     [facility]  cc:   Shani Argueta MD  [address] [place] [address]      "

## 2025-01-20 NOTE — PROGRESS NOTES
Cardiology Clinic Progress Note:    January 20, 2025   Patient Name: Concha De Jesus  Patient MRN: [id number]     Consult indication: CAC    HPI:    I had the opportunity to see patient Concha De Jesus in cardiology clinic for a follow up visit. Patient is followed by our colleague Shani Argueta MD with Primary Care.     As you know patient is a pleasant 79-year-old female with a past medical history significant for hypertension, dyslipidemia, calcific coronary artery disease, longstanding history of smoking, who presents for follow-up.    I had seen patient in clinic for consultation 10/5/2023.  At that time she had recently been assessed with an exercise stress echocardiogram that was negative for evidence of inducible ischemia.  LVEF 60 to 65%.  Calcific aortic valve without stenosis, trace aortic insufficiency.  CT lung cancer screening study 7/2023 demonstrated aortic calcifications and severe coronary artery calcifications.    Overall patient reports that she has been doing well.  She continues to exercise at the gym several times a week, walking around the track.  She denies any chest pain, chest pressure, abnormal shortness of breath.  She does continue to smoke about half a pack of cigarettes per day.  Blood pressure today in clinic was markedly elevated at 190/90 mmHg, however has been lower in the past.  She does not check her blood pressure regularly at home.  Otherwise no specific complaints/concerns, denies symptoms of orthopnea/PND, abnormal lower extremity swelling.    She is accompanied today by her sister, whom I will also be seeing in clinic in the future with skilled nursing of my partner Dr. Palma.    Assessment and Plan/Recommendations:    # Coronary artery calcifications on CT lung cancer screening scan.  No angina. [facility]23 negative for ischemia or infarction, normal resting LVEF 60-65%  # HTN, BP elevated  # HL, on statin  # Smoking,  longstanding  # Mild AI, aortic sclerosis     - Continue rosuvastatin 40 mg nightly  - Continue aspirin 81 mg daily  - Will change lisinopril 40 mg daily to 20 mg twice daily  - Will add amlodipine 2.5 mg daily, cautioned on possible side effects  - Advised to monitor blood pressures at home and to contact our team if levels are consistently over 130/80 mmHg  - Smoking cessation encouraged  - Sublingual nitroglycerin as needed  - Encouraged continued healthy lifestyle habits  - TTE  - Follow-up in 1 year per patient preference, or sooner as needed    Thank you for allowing our team to participate in the care of Concha De Jesus.  Please do not hesitate to call or page me with any questions or concerns.    Sincerely,     Aramis Rodrigues MD, [facility]  Cardiology  Text Page   January 20, 2025    cc  Shani Argueta MD  [address] [place] [address] [place] [address]    Voice recognition software utilized.       Past Medical History:     Past Medical History:   Diagnosis Date    Coronary artery disease involving native coronary artery of native heart without angina pectoris 09/15/2021    Essential hypertension 05/05/2021        Past Surgical History:   No past surgical history on file.    Medications (outpatient):  Current Outpatient Medications   Medication Sig Dispense Refill    alendronate (FOSAMAX) 70 MG tablet Take 1 tablet (70 mg) by mouth every 7 days. 12 tablet 1    amLODIPine (NORVASC) 2.5 MG tablet Take 1 tablet (2.5 mg) by mouth daily. 90 tablet 3    aspirin 81 MG EC tablet Take 81 mg by mouth daily      calcium carbonate (OS-BIBI) 1500 (600 Ca) MG tablet 2 times daily.      Cholecalciferol (VITAMIN D3) 50 MCG (2000 UT) CAPS       lisinopril (ZESTRIL) 20 MG tablet Take 1 tablet (20 mg) by mouth 2 times daily. TAKE 1 TABLET BY MOUTH EVERY  tablet 3    nitroGLYcerin (NITROSTAT) 0.4 MG sublingual tablet For chest pain place 1 tablet under the tongue every 5 minutes for 3 doses. If symptoms persist 5  "minutes after 1st dose call 911. 20 tablet 3    rosuvastatin (CRESTOR) 40 MG tablet Take 1 tablet (40 mg) by mouth at bedtime. 90 tablet 3       Allergies:  No Known Allergies    Social History:   History   Drug Use Unknown      History   Smoking Status    Every Day    Types: Cigarettes   Smokeless Tobacco    Never     Social History    Substance and Sexual Activity      Alcohol use: Not Currently       Family History:  No family history on file.    Review of Systems:   A complete review of systems was negative except as mentioned in the History of Present Illness.     Objective & Physical Exam:  BP (!) 190/90 (BP Location: Right arm, Patient Position: Sitting, Cuff Size: Adult Regular)   Pulse 65   Ht 1.486 m (4' 10.5\")   Wt 66.1 kg (145 lb 12.8 oz)   LMP  (LMP Unknown)   SpO2 97%   BMI 29.95 kg/m    Wt Readings from Last 2 Encounters:   01/20/25 66.1 kg (145 lb 12.8 oz)   10/22/24 64.4 kg (142 lb)     Body mass index is 29.95 kg/m .   Body surface area is 1.65 meters squared.    Constitutional: appears stated age, in no apparent distress, appears to be well nourished  Pulmonary: clear to auscultation bilaterally, no wheezes, no rales, no increased work of breathing  Cardiovascular: JVP normal, regular rate, regular rhythm, 2/6 LAURIE at the RUSB, no lower extremity edema    Data reviewed:  Lab Results   Component Value Date    WBC 7.1 11/30/2023    RBC 4.31 11/30/2023    HGB 13.6 11/30/2023    HCT 41.5 11/30/2023    MCV 96.3 11/30/2023    MCH 31.6 11/30/2023    MCHC 32.8 11/30/2023    RDW 12.6 11/30/2023     11/30/2023     Sodium   Date Value Ref Range Status   10/22/2024 139 135 - 146 mmol/L Final     Potassium   Date Value Ref Range Status   10/22/2024 4.5 3.5 - 5.3 mmol/L Final     Chloride   Date Value Ref Range Status   10/22/2024 105 98 - 110 mmol/L Final     Carbon Dioxide   Date Value Ref Range Status   10/22/2024 27 20 - 32 mmol/L Final     Glucose   Date Value Ref Range Status   10/22/2024 96 " "65 - 99 mg/dL Final     Comment:                   Fasting reference interval        04/26/2024 91 60 - 99 mg/dL Final     Urea Nitrogen   Date Value Ref Range Status   10/22/2024 16 7 - 25 mg/dL Final     BUN/Creatinine Ratio   Date Value Ref Range Status   10/22/2024 14 6 - 22 (calc) Final     Creatinine   Date Value Ref Range Status   10/22/2024 1.11 (H) 0.60 - 1.00 mg/dL Final     Calcium   Date Value Ref Range Status   10/22/2024 10.0 8.6 - 10.4 mg/dL Final     Bilirubin Total   Date Value Ref Range Status   04/26/2024 0.7 0.2 - 1.2 mg/dL Final     Alkaline Phosphatase   Date Value Ref Range Status   04/26/2024 54 33 - 130 U/L Final     Recent Labs   Lab Test 01/04/24  1131 10/04/22  0000   CHOL 130 146   HDL 44 53   LDL 55 73   TRIG 155* 102   CHOLHDLRATIO 3 3      No results found for: \"A1C\"     No results found for this or any previous visit (from the past 4320 hours).     "

## 2025-02-05 ENCOUNTER — HOSPITAL ENCOUNTER (OUTPATIENT)
Dept: CARDIOLOGY | Facility: CLINIC | Age: 80
Discharge: HOME | End: 2025-02-05
Attending: INTERNAL MEDICINE
Payer: COMMERCIAL

## 2025-02-05 DIAGNOSIS — I35.1 MILD AORTIC INSUFFICIENCY: ICD-10-CM

## 2025-02-05 DIAGNOSIS — R01.1 SYSTOLIC EJECTION MURMUR: ICD-10-CM

## 2025-02-05 LAB — LVEF ECHO: NORMAL

## 2025-02-05 PROCEDURE — 93306 TTE W/DOPPLER COMPLETE: CPT

## 2025-02-11 DIAGNOSIS — I35.0 AORTIC STENOSIS: ICD-10-CM

## 2025-02-11 DIAGNOSIS — I35.1 AORTIC INSUFFICIENCY: ICD-10-CM

## 2025-02-11 DIAGNOSIS — I25.10 CORONARY ARTERY DISEASE INVOLVING NATIVE CORONARY ARTERY OF NATIVE HEART WITHOUT ANGINA PECTORIS: ICD-10-CM

## 2025-03-14 PROBLEM — M85.88 OSTEOPENIA OF OTHER SITE: Status: ACTIVE | Noted: 2023-11-06

## 2025-04-28 ENCOUNTER — OFFICE VISIT (OUTPATIENT)
Dept: FAMILY MEDICINE | Facility: CLINIC | Age: 80
End: 2025-04-28

## 2025-04-28 VITALS
TEMPERATURE: 97.8 F | OXYGEN SATURATION: 97 % | SYSTOLIC BLOOD PRESSURE: 146 MMHG | DIASTOLIC BLOOD PRESSURE: 82 MMHG | HEART RATE: 62 BPM

## 2025-04-28 DIAGNOSIS — M54.41 ACUTE BILATERAL LOW BACK PAIN WITH RIGHT-SIDED SCIATICA: Primary | ICD-10-CM

## 2025-04-28 PROCEDURE — G2211 COMPLEX E/M VISIT ADD ON: HCPCS | Performed by: FAMILY MEDICINE

## 2025-04-28 PROCEDURE — 99214 OFFICE O/P EST MOD 30 MIN: CPT | Performed by: FAMILY MEDICINE

## 2025-04-28 RX ORDER — PREDNISONE 20 MG/1
20 TABLET ORAL DAILY
Qty: 5 TABLET | Refills: 0 | Status: SHIPPED | OUTPATIENT
Start: 2025-04-28 | End: 2025-05-03

## 2025-04-28 NOTE — NURSING NOTE
Chief Complaint   Patient presents with    Back Pain     Low back pain, she was unable to get out of bed Saturday, standing/bending/ twisting causes sharp pain, she would like order for MRI to [facility] so     Pre-visit Screening:  Immunizations:  up to date  Colonoscopy:  is up to date  Mammogram: is up to date  Asthma Action Test/Plan:  NA  PHQ9:  NA  GAD7:  NA  Questioned patient about current smoking habits Pt. Smokes.  Ok to leave detailed message on voice mail for today's visit only Yes, phone # [phone]

## 2025-04-28 NOTE — PROGRESS NOTES
"Assessment & Plan   Problem List Items Addressed This Visit    None  Visit Diagnoses       Acute bilateral low back pain with right-sided sciatica    -  Primary    Relevant Medications    predniSONE (DELTASONE) 20 MG tablet    Other Relevant Orders    Radiology Referral (Affiliate Use Only)    MR Lumbar Spine w/o Contrast    Orthopedic  Referral - To a [facility] Location (Use POS/Location)             1. Acute bilateral low back pain with right-sided sciatica (Primary)  Low back pain with radicular symptoms. She requested short course of prednisone for pain, MRI lumbar spine with followup with orthopedic spine specialist.  - Radiology Referral (Affiliate Use Only)  - MR Lumbar Spine w/o Contrast  - Orthopedic  Referral - To a [facility] Location (Use POS/Location)  - predniSONE (DELTASONE) 20 MG tablet; Take 1 tablet (20 mg) by mouth daily for 5 days.  Dispense: 5 tablet; Refill: 0          BMI  Estimated body mass index is 29.17 kg/m  as calculated from the following:    Height as of 1/20/25: 1.486 m (4' 10.5\").    Weight as of 3/14/25: 64.4 kg (142 lb).         FUTURE APPOINTMENTS:       - Follow-up visit as needed, per results. We manage her chronic medical care.    No follow-ups on file.    Shani Argueta MD  [facility]    Subjective     Nursing Notes:   [facility]  4/28/2025 10:35 AM  Signed  Chief Complaint   Patient presents with    Back Pain     Low back pain, she was unable to get out of bed Saturday, standing/bending/ twisting causes sharp pain, she would like order for MRI to [facility] so     Pre-visit Screening:  Immunizations:  up to date  Colonoscopy:  is up to date  Mammogram: is up to date  Asthma Action Test/Plan:  NA  PHQ9:  NA  GAD7:  NA  Questioned patient about current smoking habits Pt. Smokes.  Ok to leave detailed message on voice mail for today's visit only Yes, phone # [phone]       Concha De Jesus is a 79 year old " female who presents to clinic today for the following health issues  HPI     Here with sister. Low back pain, started with getting out of bed on Saturday, nothing precipitated this. Pain alternately goes down both legs. Abnormal gait. Has had back problems in the past, but hasn't had an MRI in the past on the back. The back pain hasn't been this bad in the past. Wants to see back specialist in [place] and needs an MRI before this. They told her to get an MRI first.   Can't golf.   Requesting prednisone.         Review of Systems   Constitutional, HEENT, cardiovascular, pulmonary, gi and gu systems are negative, except as otherwise noted.      Objective    BP (!) 146/82 (BP Location: Right arm, Patient Position: Sitting, Cuff Size: Adult Large)   Pulse 62   Temp 97.8  F (36.6  C) (Temporal)   LMP  (LMP Unknown)   SpO2 97%   There is no height or weight on file to calculate BMI.  Physical Exam   GENERAL: alert and no distress  MS: no gross musculoskeletal defects noted, no edema  PSYCH: mentation appears normal, affect normal/bright  Walking with back hunched forward, LE strength and straight leg raise normal.

## 2025-05-14 ENCOUNTER — TRANSFERRED RECORDS (OUTPATIENT)
Dept: FAMILY MEDICINE | Facility: CLINIC | Age: 80
End: 2025-05-14

## 2025-05-16 ENCOUNTER — RESULTS FOLLOW-UP (OUTPATIENT)
Dept: FAMILY MEDICINE | Facility: CLINIC | Age: 80
End: 2025-05-16

## 2025-06-18 ENCOUNTER — TRANSFERRED RECORDS (OUTPATIENT)
Dept: FAMILY MEDICINE | Facility: CLINIC | Age: 80
End: 2025-06-18

## 2025-07-17 ENCOUNTER — TRANSFERRED RECORDS (OUTPATIENT)
Dept: FAMILY MEDICINE | Facility: CLINIC | Age: 80
End: 2025-07-17

## 2025-07-21 ENCOUNTER — OFFICE VISIT (OUTPATIENT)
Dept: FAMILY MEDICINE | Facility: CLINIC | Age: 80
End: 2025-07-21

## 2025-07-21 VITALS
HEART RATE: 60 BPM | OXYGEN SATURATION: 98 % | HEIGHT: 59 IN | TEMPERATURE: 97.8 F | SYSTOLIC BLOOD PRESSURE: 138 MMHG | DIASTOLIC BLOOD PRESSURE: 82 MMHG | BODY MASS INDEX: 28.43 KG/M2 | WEIGHT: 141 LBS

## 2025-07-21 DIAGNOSIS — M25.551 CHRONIC PAIN OF RIGHT HIP: ICD-10-CM

## 2025-07-21 DIAGNOSIS — Z01.818 PREOPERATIVE EXAMINATION: Primary | ICD-10-CM

## 2025-07-21 DIAGNOSIS — M85.88 OSTEOPENIA OF OTHER SITE: ICD-10-CM

## 2025-07-21 DIAGNOSIS — D09.9 SQUAMOUS CELL CARCINOMA IN SITU: ICD-10-CM

## 2025-07-21 DIAGNOSIS — Z87.891 PERSONAL HISTORY OF TOBACCO USE, PRESENTING HAZARDS TO HEALTH: ICD-10-CM

## 2025-07-21 DIAGNOSIS — I10 ESSENTIAL HYPERTENSION: ICD-10-CM

## 2025-07-21 DIAGNOSIS — R94.39 ABNORMAL RESULT OF OTHER CARDIOVASCULAR FUNCTION STUDY: ICD-10-CM

## 2025-07-21 DIAGNOSIS — E78.49 OTHER HYPERLIPIDEMIA: ICD-10-CM

## 2025-07-21 DIAGNOSIS — N28.1 ACQUIRED CYSTIC KIDNEY DISEASE: ICD-10-CM

## 2025-07-21 DIAGNOSIS — G89.29 CHRONIC PAIN OF RIGHT HIP: ICD-10-CM

## 2025-07-21 DIAGNOSIS — Z85.820 HISTORY OF MELANOMA: ICD-10-CM

## 2025-07-21 DIAGNOSIS — I25.10 CORONARY ARTERY DISEASE INVOLVING NATIVE CORONARY ARTERY OF NATIVE HEART WITHOUT ANGINA PECTORIS: ICD-10-CM

## 2025-07-21 LAB
% GRANULOCYTES: 58.3 %
BUN SERPL-MCNC: 10 MG/DL (ref 7–25)
BUN/CREATININE RATIO: 9 (ref 6–32)
CALCIUM SERPL-MCNC: 9.9 MG/DL (ref 8.6–10.3)
CHLORIDE SERPLBLD-SCNC: 101 MMOL/L (ref 98–110)
CO2 SERPL-SCNC: 31.7 MMOL/L (ref 20–32)
CREAT SERPL-MCNC: 1.08 MG/DL (ref 0.6–1.3)
GLUCOSE SERPL-MCNC: 95 MG/DL (ref 60–99)
HCT VFR BLD AUTO: 40.5 % (ref 35–47)
HEMOGLOBIN: 13.3 G/DL (ref 11.7–15.7)
LYMPHOCYTES NFR BLD AUTO: 33.1 %
MCH RBC QN AUTO: 31.3 PG (ref 26–33)
MCHC RBC AUTO-ENTMCNC: 32.8 G/DL (ref 31–36)
MCV RBC AUTO: 95.3 FL (ref 78–100)
MONOCYTES NFR BLD AUTO: 8.6 %
PLATELET COUNT - QUEST: 249 10^9/L (ref 150–375)
POTASSIUM SERPL-SCNC: 4.2 MMOL/L (ref 3.5–5.3)
RBC # BLD AUTO: 4.25 10*12/L (ref 3.8–5.2)
SODIUM SERPL-SCNC: 139.2 MMOL/L (ref 135–146)
WBC # BLD AUTO: 7.3 10*9/L (ref 4–11)

## 2025-07-21 PROCEDURE — 99213 OFFICE O/P EST LOW 20 MIN: CPT | Performed by: FAMILY MEDICINE

## 2025-07-21 PROCEDURE — 85025 COMPLETE CBC W/AUTO DIFF WBC: CPT | Performed by: FAMILY MEDICINE

## 2025-07-21 PROCEDURE — 80048 BASIC METABOLIC PNL TOTAL CA: CPT | Performed by: FAMILY MEDICINE

## 2025-07-21 PROCEDURE — 93000 ELECTROCARDIOGRAM COMPLETE: CPT | Performed by: FAMILY MEDICINE

## 2025-07-21 PROCEDURE — 36415 COLL VENOUS BLD VENIPUNCTURE: CPT | Performed by: FAMILY MEDICINE

## 2025-07-21 PROCEDURE — G2211 COMPLEX E/M VISIT ADD ON: HCPCS | Performed by: FAMILY MEDICINE

## 2025-07-21 NOTE — PROGRESS NOTES
Preoperative Evaluation  [facility]  [address] [place]  [address]  [facility] [address]  Phone: [phone]  Fax: [phone]  Primary Provider: Shani Argueta MD  Pre-op Performing Provider: Shani Argueta MD  Jul 21, 2025 7/21/2025   Surgical Information   What procedure is being done? Right total hip arthroplasty   Facility or Hospital where procedure/surgery will be performed: [facility][1]   Who is doing the procedure / surgery? Dr. Magen Amaral   Date of surgery / procedure: 08/14/25   Time of surgery / procedure: AM   Where do you plan to recover after surgery? Other - One night stay      Fax number for surgical facility: Note does not need to be faxed, will be available electronically in Epic.    Assessment & Plan     The proposed surgical procedure is considered INTERMEDIATE risk.    Problem List Items Addressed This Visit       Essential hypertension    Other hyperlipidemia    History of melanoma    Abnormal result of other cardiovascular function study    Coronary artery disease involving native coronary artery of native heart without angina pectoris    Personal history of tobacco use, presenting hazards to health    Acquired cystic kidney disease    Squamous cell carcinoma in situ    Osteopenia of other site--fosamax started 11.2023     Other Visit Diagnoses         Preoperative examination    -  Primary    Relevant Orders    VENOUS COLLECTION (Completed)    HEMOGRAM PLATELET DIFF (BFP) (Completed)    Basic Metabolic Panel (BFP) (Completed)    EKG 12-lead complete w/read - Clinics (Completed)      Chronic pain of right hip                  1. Preoperative examination (Primary)    - VENOUS COLLECTION  - HEMOGRAM PLATELET DIFF (BFP)  - Basic Metabolic Panel (BFP)  - EKG 12-lead complete w/read - Clinics    2. Chronic pain of right hip      3. Acquired cystic kidney disease      4. History of melanoma      5. Abnormal result of other cardiovascular  function study      6. Coronary artery disease involving native coronary artery of native heart without angina pectoris  Followed by cardiology. EKG today and recent echo reviewed with enrrique Pradhan.    7. Osteopenia of other site--fosamax started 11.2023      8. Personal history of tobacco use, presenting hazards to health      9. Other hyperlipidemia      10. Essential hypertension      11. Squamous cell carcinoma in situ        - No identified additional risk factors other than previously addressed    Antiplatelet or Anticoagulation Medication Instructions   - We reviewed the medication list and the patient is not on an antiplatelet or anticoagulation medications.   - aspirin: Discontinue aspirin 7 days prior to procedure to reduce bleeding risk. It should be resumed postoperatively.     Additional Medication Instructions  Take all scheduled medications on the day of surgery   - Herbal medications and vitamins: DO NOT TAKE 14 days prior to surgery.    Recommendation  Approval given to proceed with proposed procedure, without further diagnostic evaluation.        Haley Kern is a 80 year old, presenting for the following:  Pre-Op Exam        HPI: here with sister. Here for right hip replacement, started with back pain, then had MRI, saw another back doctor then they saw another surgeon who does hips. Has degenerative disc disease and the pain was coming from the right hip.it's been bothering her for a couple of months. Getting worse.          7/21/2025   Pre-Op Questionnaire   Have you ever had a heart attack or stroke? No   Have you ever had surgery on your heart or blood vessels, such as a stent placement, a coronary artery bypass, or surgery on an artery in your head, neck, heart, or legs? No   Do you have chest pain with activity? No   Do you have a history of heart failure? No   Do you currently have a cold, bronchitis or symptoms of other infection? No   Do you have a cough, shortness of breath, or  wheezing? No   Do you or anyone in your family have previous history of blood clots? No   Do you or does anyone in your family have a serious bleeding problem such as prolonged bleeding following surgeries or cuts? No   Have you ever had problems with anemia or been told to take iron pills? No   Have you had any abnormal blood loss such as black, tarry or bloody stools, or abnormal vaginal bleeding? No   Have you ever had a blood transfusion? No   Are you willing to have a blood transfusion if it is medically needed before, during, or after your surgery? Yes   Have you or any of your relatives ever had problems with anesthesia? No   Do you have sleep apnea, excessive snoring or daytime drowsiness? No   Do you have any artifical heart valves or other implanted medical devices like a pacemaker, defibrillator, or continuous glucose monitor? No   Do you have artificial joints? No   Are you allergic to latex? No     Advance Care Planning    Discussed advance care planning with patient; informed AVS has link to [employer].    Preoperative Review of    reviewed - no record of controlled substances prescribed.          Patient Active Problem List    Diagnosis Date Noted    Osteopenia of other site--fosamax started 11.2023 11/06/2023     Priority: Medium    Squamous cell carcinoma in situ 07/26/2023     Priority: Medium    Personal history of tobacco use, presenting hazards to health 10/04/2022     Priority: Medium    Family history of ischemic heart disease 10/05/2021     Priority: Medium    Heart murmur 10/05/2021     Priority: Medium    Coronary artery disease involving native coronary artery of native heart without angina pectoris 09/15/2021     Priority: Medium    History of melanoma 05/25/2021     Priority: Medium    Essential hypertension 05/05/2021     Priority: Medium    Other hyperlipidemia 05/05/2021     Priority: Medium    Acquired cystic kidney disease 08/19/2016     Priority: Medium     N28.1 :  Acquired renal cystic disease      Abnormal result of other cardiovascular function study 07/18/2008     Priority: Medium     Formatting of this note might be different from the original.  Abnormal stress echo      ACP (advance care planning) 05/05/2021     Priority: Low      Past Medical History:   Diagnosis Date    Coronary artery disease involving native coronary artery of native heart without angina pectoris 09/15/2021    Essential hypertension 05/05/2021     No past surgical history on file.  Current Outpatient Medications   Medication Sig Dispense Refill    alendronate (FOSAMAX) 70 MG tablet Take 1 tablet (70 mg) by mouth every 7 days. 12 tablet 1    amLODIPine (NORVASC) 2.5 MG tablet Take 1 tablet (2.5 mg) by mouth daily. 90 tablet 3    aspirin 81 MG EC tablet Take 81 mg by mouth daily      calcium carbonate (OS-BIBI) 1500 (600 Ca) MG tablet 2 times daily.      Cholecalciferol (VITAMIN D3) 50 MCG (2000 UT) CAPS       lisinopril (ZESTRIL) 20 MG tablet Take 1 tablet (20 mg) by mouth 2 times daily. TAKE 1 TABLET BY MOUTH EVERY  tablet 3    nitroGLYcerin (NITROSTAT) 0.4 MG sublingual tablet For chest pain place 1 tablet under the tongue every 5 minutes for 3 doses. If symptoms persist 5 minutes after 1st dose call 911. 20 tablet 3    rosuvastatin (CRESTOR) 40 MG tablet Take 1 tablet (40 mg) by mouth at bedtime. 90 tablet 3       No Known Allergies     Social History     Tobacco Use    Smoking status: Every Day     Current packs/day: 0.50     Types: Cigarettes     Passive exposure: Past    Smokeless tobacco: Never   Substance Use Topics    Alcohol use: Not Currently     No family history on file.  History   Drug Use Unknown             Review of Systems  Constitutional, HEENT, cardiovascular, pulmonary, gi and gu systems are negative, except as otherwise noted.    Objective    /82 (BP Location: Right arm, Patient Position: Sitting, Cuff Size: Adult Large)   Pulse 60   Temp 97.8  F (36.6  C)  "(Temporal)   Ht 1.486 m (4' 10.5\")   Wt 64 kg (141 lb)   LMP  (LMP Unknown)   SpO2 98%   BMI 28.97 kg/m     Estimated body mass index is 28.97 kg/m  as calculated from the following:    Height as of this encounter: 1.486 m (4' 10.5\").    Weight as of this encounter: 64 kg (141 lb).  Physical Exam  GENERAL: alert and no distress  EYES: Eyes grossly normal to inspection, PERRL and conjunctivae and sclerae normal  HENT: ear canals and TM's normal, nose and mouth without ulcers or lesions  NECK: no adenopathy, no asymmetry, masses, or scars  RESP: lungs clear to auscultation - no rales, rhonchi or wheezes  CV: regular rate and rhythm, normal S1 S2, no S3 or S4, no murmur, click or rub, no peripheral edema  ABDOMEN: soft, nontender, no hepatosplenomegaly, no masses and bowel sounds normal  MS: no gross musculoskeletal defects noted, no edema  SKIN: no suspicious lesions or rashes  NEURO: Normal strength and tone, mentation intact and speech normal  PSYCH: mentation appears normal, affect normal/bright    Recent Labs   Lab Test 03/14/25  0000 10/22/24  0922   .5 139   POTASSIUM 3.86 4.5   CR 1.24 1.11*        Diagnostics  Recent Results (from the past 240 hours)   HEMOGRAM PLATELET DIFF (BFP)    Collection Time: 07/21/25 12:00 AM   Result Value Ref Range    WBC 7.3 4.0 - 11 10*9/L    RBC Count 4.25 3.8 - 5.2 10*12/L    Hemoglobin 13.3 11.7 - 15.7 g/dL    Hematocrit 40.5 35.0 - 47.0 %    MCV 95.3 78 - 100 fL    MCH 31.3 26 - 33 pg    MCHC 32.8 31 - 36 g/dL    Platelet Count 249 150 - 375 10^9/L    % Granulocytes 58.3 %    % Lymphocytes 33.1 %    % Monocytes 8.6 %   Basic Metabolic Panel (BFP)    Collection Time: 07/21/25 12:00 AM   Result Value Ref Range    Carbon Dioxide 31.7 20 - 32 mmol/L    Creatinine 1.08 0.60 - 1.30 mg/dL    Glucose 95 60 - 99 mg/dL    Sodium 139.2 135 - 146 mmol/L    Potassium 4.2 3.5 - 5.3 mmol/L    Chloride 101.0 98 - 110 mmol/L    Urea Nitrogen 10 7 - 25 mg/dL    Calcium 9.9 8.6 - 10.3 " mg/dL    BUN/Creatinine Ratio 9 6 - 32    EKG: no changes, reviewed with Dr. Rodrigues    Revised Cardiac Risk Index (RCRI)  The patient has the following serious cardiovascular risks for perioperative complications:   - No serious cardiac risks = 0 points     RCRI Interpretation: 0 points: Class I (very low risk - 0.4% complication rate)         Signed Electronically by: Shani Argueta MD  A copy of this evaluation report is provided to the requesting physician.

## 2025-08-08 RX ORDER — ACETAMINOPHEN 500 MG
500-1000 TABLET ORAL EVERY 6 HOURS PRN
COMMUNITY

## 2025-08-13 ENCOUNTER — PREP FOR PROCEDURE (OUTPATIENT)
Dept: SURGERY | Facility: CLINIC | Age: 80
End: 2025-08-13
Payer: COMMERCIAL

## 2025-08-13 DIAGNOSIS — M16.11 DEGENERATIVE JOINT DISEASE OF RIGHT HIP: Primary | ICD-10-CM

## 2025-08-13 RX ORDER — TRANEXAMIC ACID 650 MG/1
1950 TABLET ORAL ONCE
Status: CANCELLED | OUTPATIENT
Start: 2025-08-13 | End: 2025-08-13

## 2025-08-13 RX ORDER — CEFAZOLIN SODIUM/WATER 2 G/20 ML
2 SYRINGE (ML) INTRAVENOUS SEE ADMIN INSTRUCTIONS
Status: CANCELLED | OUTPATIENT
Start: 2025-08-14

## 2025-08-13 RX ORDER — CELECOXIB 200 MG/1
400 CAPSULE ORAL ONCE
Status: CANCELLED | OUTPATIENT
Start: 2025-08-13 | End: 2025-08-13

## 2025-08-13 RX ORDER — CEFAZOLIN SODIUM/WATER 2 G/20 ML
2 SYRINGE (ML) INTRAVENOUS
Status: CANCELLED | OUTPATIENT
Start: 2025-08-14

## 2025-08-13 RX ORDER — ACETAMINOPHEN 325 MG/1
975 TABLET ORAL ONCE
Status: CANCELLED | OUTPATIENT
Start: 2025-08-13 | End: 2025-08-13

## 2025-08-14 ENCOUNTER — ANESTHESIA (OUTPATIENT)
Dept: SURGERY | Facility: CLINIC | Age: 80
End: 2025-08-14
Payer: COMMERCIAL

## 2025-08-14 ENCOUNTER — APPOINTMENT (OUTPATIENT)
Dept: PHYSICAL THERAPY | Facility: CLINIC | Age: 80
End: 2025-08-14
Attending: ORTHOPAEDIC SURGERY
Payer: COMMERCIAL

## 2025-08-14 ENCOUNTER — HOSPITAL ENCOUNTER (OUTPATIENT)
Facility: CLINIC | Age: 80
Discharge: HOME | End: 2025-08-15
Attending: ORTHOPAEDIC SURGERY | Admitting: ORTHOPAEDIC SURGERY
Payer: COMMERCIAL

## 2025-08-14 ENCOUNTER — ANESTHESIA EVENT (OUTPATIENT)
Dept: SURGERY | Facility: CLINIC | Age: 80
End: 2025-08-14
Payer: COMMERCIAL

## 2025-08-14 ENCOUNTER — APPOINTMENT (OUTPATIENT)
Dept: RADIOLOGY | Facility: CLINIC | Age: 80
End: 2025-08-14
Attending: ORTHOPAEDIC SURGERY
Payer: COMMERCIAL

## 2025-08-14 DIAGNOSIS — Z96.641 STATUS POST TOTAL REPLACEMENT OF RIGHT HIP: Primary | ICD-10-CM

## 2025-08-14 DIAGNOSIS — M16.11 DEGENERATIVE JOINT DISEASE OF RIGHT HIP: ICD-10-CM

## 2025-08-14 PROBLEM — Z96.649 S/P TOTAL HIP ARTHROPLASTY: Status: ACTIVE | Noted: 2025-08-14

## 2025-08-14 LAB
CREAT SERPL-MCNC: 1.02 MG/DL (ref 0.51–0.95)
EGFRCR SERPLBLD CKD-EPI 2021: 56 ML/MIN/1.73M2
MCV RBC AUTO: 90.2 FL (ref 78–100)
PLATELET # BLD AUTO: 254 10E3/UL (ref 150–450)

## 2025-08-14 PROCEDURE — 999N000065 XR PELVIS PORT 1/2 VIEWS

## 2025-08-14 PROCEDURE — 258N000003 HC RX IP 258 OP 636: Performed by: ANESTHESIOLOGY

## 2025-08-14 PROCEDURE — 999N000141 HC STATISTIC PRE-PROCEDURE NURSING ASSESSMENT: Performed by: ORTHOPAEDIC SURGERY

## 2025-08-14 PROCEDURE — 258N000001 HC RX 258: Performed by: ORTHOPAEDIC SURGERY

## 2025-08-14 PROCEDURE — 272N000001 HC OR GENERAL SUPPLY STERILE: Performed by: ORTHOPAEDIC SURGERY

## 2025-08-14 PROCEDURE — 250N000009 HC RX 250: Performed by: ORTHOPAEDIC SURGERY

## 2025-08-14 PROCEDURE — 250N000013 HC RX MED GY IP 250 OP 250 PS 637: Performed by: INTERNAL MEDICINE

## 2025-08-14 PROCEDURE — 710N000010 HC RECOVERY PHASE 1, LEVEL 2, PER MIN: Performed by: ORTHOPAEDIC SURGERY

## 2025-08-14 PROCEDURE — C1776 JOINT DEVICE (IMPLANTABLE): HCPCS | Performed by: ORTHOPAEDIC SURGERY

## 2025-08-14 PROCEDURE — C1713 ANCHOR/SCREW BN/BN,TIS/BN: HCPCS | Performed by: ORTHOPAEDIC SURGERY

## 2025-08-14 PROCEDURE — 250N000009 HC RX 250: Performed by: ANESTHESIOLOGY

## 2025-08-14 PROCEDURE — 99204 OFFICE O/P NEW MOD 45 MIN: CPT | Performed by: INTERNAL MEDICINE

## 2025-08-14 PROCEDURE — 36415 COLL VENOUS BLD VENIPUNCTURE: CPT | Performed by: ORTHOPAEDIC SURGERY

## 2025-08-14 PROCEDURE — 250N000011 HC RX IP 250 OP 636: Performed by: ORTHOPAEDIC SURGERY

## 2025-08-14 PROCEDURE — 97116 GAIT TRAINING THERAPY: CPT | Mod: GP | Performed by: PHYSICAL THERAPIST

## 2025-08-14 PROCEDURE — 250N000011 HC RX IP 250 OP 636: Performed by: ANESTHESIOLOGY

## 2025-08-14 PROCEDURE — 250N000013 HC RX MED GY IP 250 OP 250 PS 637: Performed by: ORTHOPAEDIC SURGERY

## 2025-08-14 PROCEDURE — 85049 AUTOMATED PLATELET COUNT: CPT | Performed by: ORTHOPAEDIC SURGERY

## 2025-08-14 PROCEDURE — 370N000017 HC ANESTHESIA TECHNICAL FEE, PER MIN: Performed by: ORTHOPAEDIC SURGERY

## 2025-08-14 PROCEDURE — 97110 THERAPEUTIC EXERCISES: CPT | Mod: GP | Performed by: PHYSICAL THERAPIST

## 2025-08-14 PROCEDURE — 82565 ASSAY OF CREATININE: CPT | Performed by: ORTHOPAEDIC SURGERY

## 2025-08-14 PROCEDURE — 360N000077 HC SURGERY LEVEL 4, PER MIN: Performed by: ORTHOPAEDIC SURGERY

## 2025-08-14 PROCEDURE — 97161 PT EVAL LOW COMPLEX 20 MIN: CPT | Mod: GP | Performed by: PHYSICAL THERAPIST

## 2025-08-14 DEVICE — IMPLANTABLE DEVICE: Type: IMPLANTABLE DEVICE | Site: HIP | Status: FUNCTIONAL

## 2025-08-14 DEVICE — IMP SCR ACET SNN SPHERICAL HEAD 6.5X35MM 71332535: Type: IMPLANTABLE DEVICE | Site: HIP | Status: FUNCTIONAL

## 2025-08-14 RX ORDER — MAGNESIUM HYDROXIDE 1200 MG/15ML
LIQUID ORAL PRN
Status: DISCONTINUED | OUTPATIENT
Start: 2025-08-14 | End: 2025-08-14 | Stop reason: HOSPADM

## 2025-08-14 RX ORDER — HYDROXYZINE HYDROCHLORIDE 10 MG/1
10 TABLET, FILM COATED ORAL EVERY 6 HOURS PRN
Status: DISCONTINUED | OUTPATIENT
Start: 2025-08-14 | End: 2025-08-15 | Stop reason: HOSPADM

## 2025-08-14 RX ORDER — AMOXICILLIN 250 MG
1 CAPSULE ORAL 2 TIMES DAILY
Status: DISCONTINUED | OUTPATIENT
Start: 2025-08-14 | End: 2025-08-15 | Stop reason: HOSPADM

## 2025-08-14 RX ORDER — OXYCODONE HYDROCHLORIDE 5 MG/1
10 TABLET ORAL EVERY 4 HOURS PRN
Status: DISCONTINUED | OUTPATIENT
Start: 2025-08-14 | End: 2025-08-15 | Stop reason: HOSPADM

## 2025-08-14 RX ORDER — PROCHLORPERAZINE MALEATE 5 MG/1
5 TABLET ORAL EVERY 6 HOURS PRN
Status: DISCONTINUED | OUTPATIENT
Start: 2025-08-14 | End: 2025-08-15 | Stop reason: HOSPADM

## 2025-08-14 RX ORDER — ENOXAPARIN SODIUM 100 MG/ML
40 INJECTION SUBCUTANEOUS EVERY 24 HOURS
Status: DISCONTINUED | OUTPATIENT
Start: 2025-08-15 | End: 2025-08-15 | Stop reason: HOSPADM

## 2025-08-14 RX ORDER — LIDOCAINE HYDROCHLORIDE 10 MG/ML
INJECTION, SOLUTION INFILTRATION; PERINEURAL PRN
Status: DISCONTINUED | OUTPATIENT
Start: 2025-08-14 | End: 2025-08-14

## 2025-08-14 RX ORDER — HYDROMORPHONE HCL IN WATER/PF 6 MG/30 ML
0.2 PATIENT CONTROLLED ANALGESIA SYRINGE INTRAVENOUS
Status: DISCONTINUED | OUTPATIENT
Start: 2025-08-14 | End: 2025-08-15 | Stop reason: HOSPADM

## 2025-08-14 RX ORDER — CEFAZOLIN SODIUM/WATER 2 G/20 ML
2 SYRINGE (ML) INTRAVENOUS SEE ADMIN INSTRUCTIONS
Status: DISCONTINUED | OUTPATIENT
Start: 2025-08-14 | End: 2025-08-14 | Stop reason: HOSPADM

## 2025-08-14 RX ORDER — PROPOFOL 10 MG/ML
INJECTION, EMULSION INTRAVENOUS CONTINUOUS PRN
Status: DISCONTINUED | OUTPATIENT
Start: 2025-08-14 | End: 2025-08-14

## 2025-08-14 RX ORDER — BUPIVACAINE HYDROCHLORIDE 5 MG/ML
INJECTION, SOLUTION EPIDURAL; INTRACAUDAL; PERINEURAL
Status: COMPLETED | OUTPATIENT
Start: 2025-08-14 | End: 2025-08-14

## 2025-08-14 RX ORDER — ONDANSETRON 4 MG/1
4 TABLET, ORALLY DISINTEGRATING ORAL EVERY 30 MIN PRN
Status: DISCONTINUED | OUTPATIENT
Start: 2025-08-14 | End: 2025-08-14 | Stop reason: HOSPADM

## 2025-08-14 RX ORDER — DEXAMETHASONE SODIUM PHOSPHATE 10 MG/ML
INJECTION, SOLUTION INTRAMUSCULAR; INTRAVENOUS PRN
Status: DISCONTINUED | OUTPATIENT
Start: 2025-08-14 | End: 2025-08-14

## 2025-08-14 RX ORDER — ONDANSETRON 2 MG/ML
INJECTION INTRAMUSCULAR; INTRAVENOUS PRN
Status: DISCONTINUED | OUTPATIENT
Start: 2025-08-14 | End: 2025-08-14

## 2025-08-14 RX ORDER — SODIUM CHLORIDE, SODIUM LACTATE, POTASSIUM CHLORIDE, CALCIUM CHLORIDE 600; 310; 30; 20 MG/100ML; MG/100ML; MG/100ML; MG/100ML
INJECTION, SOLUTION INTRAVENOUS CONTINUOUS
Status: DISCONTINUED | OUTPATIENT
Start: 2025-08-14 | End: 2025-08-14 | Stop reason: HOSPADM

## 2025-08-14 RX ORDER — POLYETHYLENE GLYCOL 3350 17 G/17G
17 POWDER, FOR SOLUTION ORAL DAILY
Status: DISCONTINUED | OUTPATIENT
Start: 2025-08-15 | End: 2025-08-15 | Stop reason: HOSPADM

## 2025-08-14 RX ORDER — LIDOCAINE 40 MG/G
CREAM TOPICAL
Status: DISCONTINUED | OUTPATIENT
Start: 2025-08-14 | End: 2025-08-15 | Stop reason: HOSPADM

## 2025-08-14 RX ORDER — NALOXONE HYDROCHLORIDE 0.4 MG/ML
0.1 INJECTION, SOLUTION INTRAMUSCULAR; INTRAVENOUS; SUBCUTANEOUS
Status: DISCONTINUED | OUTPATIENT
Start: 2025-08-14 | End: 2025-08-14 | Stop reason: HOSPADM

## 2025-08-14 RX ORDER — ACETAMINOPHEN 325 MG/1
975 TABLET ORAL EVERY 8 HOURS
Status: DISCONTINUED | OUTPATIENT
Start: 2025-08-14 | End: 2025-08-15 | Stop reason: HOSPADM

## 2025-08-14 RX ORDER — FENTANYL CITRATE 50 UG/ML
25 INJECTION, SOLUTION INTRAMUSCULAR; INTRAVENOUS EVERY 5 MIN PRN
Status: DISCONTINUED | OUTPATIENT
Start: 2025-08-14 | End: 2025-08-14 | Stop reason: HOSPADM

## 2025-08-14 RX ORDER — ONDANSETRON 4 MG/1
4 TABLET, ORALLY DISINTEGRATING ORAL EVERY 6 HOURS PRN
Status: DISCONTINUED | OUTPATIENT
Start: 2025-08-14 | End: 2025-08-15 | Stop reason: HOSPADM

## 2025-08-14 RX ORDER — CELECOXIB 200 MG/1
400 CAPSULE ORAL ONCE
Status: COMPLETED | OUTPATIENT
Start: 2025-08-14 | End: 2025-08-14

## 2025-08-14 RX ORDER — FENTANYL CITRATE 0.05 MG/ML
INJECTION, SOLUTION INTRAMUSCULAR; INTRAVENOUS PRN
Status: DISCONTINUED | OUTPATIENT
Start: 2025-08-14 | End: 2025-08-14

## 2025-08-14 RX ORDER — NITROGLYCERIN 0.4 MG/1
0.4 TABLET SUBLINGUAL EVERY 5 MIN PRN
Status: DISCONTINUED | OUTPATIENT
Start: 2025-08-14 | End: 2025-08-15 | Stop reason: HOSPADM

## 2025-08-14 RX ORDER — TRANEXAMIC ACID 650 MG/1
1950 TABLET ORAL ONCE
Status: COMPLETED | OUTPATIENT
Start: 2025-08-14 | End: 2025-08-14

## 2025-08-14 RX ORDER — CEFAZOLIN SODIUM/WATER 2 G/20 ML
2 SYRINGE (ML) INTRAVENOUS
Status: COMPLETED | OUTPATIENT
Start: 2025-08-14 | End: 2025-08-14

## 2025-08-14 RX ORDER — ACETAMINOPHEN 325 MG/1
975 TABLET ORAL ONCE
Status: COMPLETED | OUTPATIENT
Start: 2025-08-14 | End: 2025-08-14

## 2025-08-14 RX ORDER — ONDANSETRON 2 MG/ML
4 INJECTION INTRAMUSCULAR; INTRAVENOUS EVERY 6 HOURS PRN
Status: DISCONTINUED | OUTPATIENT
Start: 2025-08-14 | End: 2025-08-15 | Stop reason: HOSPADM

## 2025-08-14 RX ORDER — BISACODYL 10 MG
10 SUPPOSITORY, RECTAL RECTAL DAILY PRN
Status: DISCONTINUED | OUTPATIENT
Start: 2025-08-17 | End: 2025-08-15 | Stop reason: HOSPADM

## 2025-08-14 RX ORDER — CEFAZOLIN SODIUM 1 G/3ML
1 INJECTION, POWDER, FOR SOLUTION INTRAMUSCULAR; INTRAVENOUS EVERY 8 HOURS
Status: COMPLETED | OUTPATIENT
Start: 2025-08-14 | End: 2025-08-15

## 2025-08-14 RX ORDER — OXYCODONE HYDROCHLORIDE 5 MG/1
5 TABLET ORAL EVERY 4 HOURS PRN
Status: DISCONTINUED | OUTPATIENT
Start: 2025-08-14 | End: 2025-08-15 | Stop reason: HOSPADM

## 2025-08-14 RX ORDER — ROSUVASTATIN CALCIUM 10 MG/1
40 TABLET, COATED ORAL AT BEDTIME
Status: DISCONTINUED | OUTPATIENT
Start: 2025-08-14 | End: 2025-08-15 | Stop reason: HOSPADM

## 2025-08-14 RX ORDER — HYDROMORPHONE HCL IN WATER/PF 6 MG/30 ML
0.2 PATIENT CONTROLLED ANALGESIA SYRINGE INTRAVENOUS EVERY 5 MIN PRN
Status: DISCONTINUED | OUTPATIENT
Start: 2025-08-14 | End: 2025-08-14 | Stop reason: HOSPADM

## 2025-08-14 RX ORDER — FENTANYL CITRATE 50 UG/ML
50 INJECTION, SOLUTION INTRAMUSCULAR; INTRAVENOUS EVERY 5 MIN PRN
Status: DISCONTINUED | OUTPATIENT
Start: 2025-08-14 | End: 2025-08-14 | Stop reason: HOSPADM

## 2025-08-14 RX ORDER — HYDROMORPHONE HCL IN WATER/PF 6 MG/30 ML
0.4 PATIENT CONTROLLED ANALGESIA SYRINGE INTRAVENOUS
Status: DISCONTINUED | OUTPATIENT
Start: 2025-08-14 | End: 2025-08-15 | Stop reason: HOSPADM

## 2025-08-14 RX ORDER — ONDANSETRON 2 MG/ML
4 INJECTION INTRAMUSCULAR; INTRAVENOUS EVERY 30 MIN PRN
Status: DISCONTINUED | OUTPATIENT
Start: 2025-08-14 | End: 2025-08-14 | Stop reason: HOSPADM

## 2025-08-14 RX ORDER — HYDROMORPHONE HCL IN WATER/PF 6 MG/30 ML
0.4 PATIENT CONTROLLED ANALGESIA SYRINGE INTRAVENOUS EVERY 5 MIN PRN
Status: DISCONTINUED | OUTPATIENT
Start: 2025-08-14 | End: 2025-08-14 | Stop reason: HOSPADM

## 2025-08-14 RX ORDER — SODIUM CHLORIDE, SODIUM LACTATE, POTASSIUM CHLORIDE, CALCIUM CHLORIDE 600; 310; 30; 20 MG/100ML; MG/100ML; MG/100ML; MG/100ML
INJECTION, SOLUTION INTRAVENOUS CONTINUOUS
Status: DISCONTINUED | OUTPATIENT
Start: 2025-08-14 | End: 2025-08-15 | Stop reason: HOSPADM

## 2025-08-14 RX ORDER — DIMENHYDRINATE 50 MG/ML
25 INJECTION, SOLUTION INTRAMUSCULAR; INTRAVENOUS
Status: DISCONTINUED | OUTPATIENT
Start: 2025-08-14 | End: 2025-08-14 | Stop reason: HOSPADM

## 2025-08-14 RX ORDER — HALOPERIDOL 5 MG/ML
1 INJECTION INTRAMUSCULAR
Status: DISCONTINUED | OUTPATIENT
Start: 2025-08-14 | End: 2025-08-14 | Stop reason: HOSPADM

## 2025-08-14 RX ORDER — LIDOCAINE 40 MG/G
CREAM TOPICAL
Status: DISCONTINUED | OUTPATIENT
Start: 2025-08-14 | End: 2025-08-14 | Stop reason: HOSPADM

## 2025-08-14 RX ADMIN — OXYCODONE HYDROCHLORIDE 5 MG: 5 TABLET ORAL at 23:45

## 2025-08-14 RX ADMIN — DOCUSATE SODIUM AND SENNOSIDES 1 TABLET: 8.6; 5 TABLET ORAL at 20:14

## 2025-08-14 RX ADMIN — ROSUVASTATIN CALCIUM 40 MG: 10 TABLET, FILM COATED ORAL at 20:14

## 2025-08-14 RX ADMIN — DEXMEDETOMIDINE HYDROCHLORIDE 8 MCG: 100 INJECTION, SOLUTION INTRAVENOUS at 11:51

## 2025-08-14 RX ADMIN — DEXAMETHASONE SODIUM PHOSPHATE 10 MG: 10 INJECTION, SOLUTION INTRAMUSCULAR; INTRAVENOUS at 10:30

## 2025-08-14 RX ADMIN — BUPIVACAINE HYDROCHLORIDE 2.6 ML: 5 INJECTION, SOLUTION EPIDURAL; INTRACAUDAL; PERINEURAL at 10:14

## 2025-08-14 RX ADMIN — LIDOCAINE HYDROCHLORIDE 2 ML: 10 INJECTION, SOLUTION INFILTRATION; PERINEURAL at 10:19

## 2025-08-14 RX ADMIN — TRANEXAMIC ACID 1950 MG: 650 TABLET ORAL at 08:36

## 2025-08-14 RX ADMIN — ACETAMINOPHEN 975 MG: 325 TABLET ORAL at 20:14

## 2025-08-14 RX ADMIN — SODIUM CHLORIDE, SODIUM LACTATE, POTASSIUM CHLORIDE, AND CALCIUM CHLORIDE: .6; .31; .03; .02 INJECTION, SOLUTION INTRAVENOUS at 11:36

## 2025-08-14 RX ADMIN — CEFAZOLIN 1 G: 1 INJECTION, POWDER, FOR SOLUTION INTRAMUSCULAR; INTRAVENOUS at 17:25

## 2025-08-14 RX ADMIN — PHENYLEPHRINE HYDROCHLORIDE 0.5 MCG/KG/MIN: 10 INJECTION INTRAVENOUS at 10:22

## 2025-08-14 RX ADMIN — DEXMEDETOMIDINE HYDROCHLORIDE 8 MCG: 100 INJECTION, SOLUTION INTRAVENOUS at 12:06

## 2025-08-14 RX ADMIN — PROPOFOL 100 MCG/KG/MIN: 10 INJECTION, EMULSION INTRAVENOUS at 10:19

## 2025-08-14 RX ADMIN — CELECOXIB 400 MG: 200 CAPSULE ORAL at 08:37

## 2025-08-14 RX ADMIN — ONDANSETRON 4 MG: 2 INJECTION INTRAMUSCULAR; INTRAVENOUS at 11:50

## 2025-08-14 RX ADMIN — ACETAMINOPHEN 975 MG: 325 TABLET ORAL at 08:36

## 2025-08-14 RX ADMIN — SODIUM CHLORIDE, SODIUM LACTATE, POTASSIUM CHLORIDE, AND CALCIUM CHLORIDE: .6; .31; .03; .02 INJECTION, SOLUTION INTRAVENOUS at 08:30

## 2025-08-14 RX ADMIN — Medication 2 G: at 10:00

## 2025-08-14 RX ADMIN — FENTANYL CITRATE 50 MCG: 0.05 INJECTION, SOLUTION INTRAMUSCULAR; INTRAVENOUS at 10:00

## 2025-08-14 ASSESSMENT — ACTIVITIES OF DAILY LIVING (ADL)
ADLS_ACUITY_SCORE: 19
ADLS_ACUITY_SCORE: 23
ADLS_ACUITY_SCORE: 23
ADLS_ACUITY_SCORE: 19
ADLS_ACUITY_SCORE: 23
ADLS_ACUITY_SCORE: 19
ADLS_ACUITY_SCORE: 20
ADLS_ACUITY_SCORE: 23
ADLS_ACUITY_SCORE: 20
ADLS_ACUITY_SCORE: 19
ADLS_ACUITY_SCORE: 19
ADLS_ACUITY_SCORE: 21
ADLS_ACUITY_SCORE: 20
ADLS_ACUITY_SCORE: 19
ADLS_ACUITY_SCORE: 19
ADLS_ACUITY_SCORE: 20

## 2025-08-15 ENCOUNTER — APPOINTMENT (OUTPATIENT)
Dept: OCCUPATIONAL THERAPY | Facility: CLINIC | Age: 80
End: 2025-08-15
Attending: ORTHOPAEDIC SURGERY
Payer: COMMERCIAL

## 2025-08-15 ENCOUNTER — APPOINTMENT (OUTPATIENT)
Dept: PHYSICAL THERAPY | Facility: CLINIC | Age: 80
End: 2025-08-15
Attending: ORTHOPAEDIC SURGERY
Payer: COMMERCIAL

## 2025-08-15 VITALS
TEMPERATURE: 97.5 F | DIASTOLIC BLOOD PRESSURE: 59 MMHG | HEIGHT: 58 IN | RESPIRATION RATE: 16 BRPM | SYSTOLIC BLOOD PRESSURE: 126 MMHG | BODY MASS INDEX: 29.6 KG/M2 | HEART RATE: 57 BPM | WEIGHT: 141 LBS | OXYGEN SATURATION: 96 %

## 2025-08-15 LAB
FASTING STATUS PATIENT QL REPORTED: ABNORMAL
GLUCOSE SERPL-MCNC: 153 MG/DL (ref 70–99)
HGB BLD-MCNC: 10.6 G/DL (ref 11.7–15.7)
MCV RBC AUTO: 90.5 FL (ref 78–100)

## 2025-08-15 PROCEDURE — 82947 ASSAY GLUCOSE BLOOD QUANT: CPT | Performed by: ORTHOPAEDIC SURGERY

## 2025-08-15 PROCEDURE — 36415 COLL VENOUS BLD VENIPUNCTURE: CPT | Performed by: ORTHOPAEDIC SURGERY

## 2025-08-15 PROCEDURE — 250N000011 HC RX IP 250 OP 636: Performed by: ORTHOPAEDIC SURGERY

## 2025-08-15 PROCEDURE — 85018 HEMOGLOBIN: CPT | Performed by: ORTHOPAEDIC SURGERY

## 2025-08-15 PROCEDURE — 97116 GAIT TRAINING THERAPY: CPT | Mod: GP,59

## 2025-08-15 PROCEDURE — 97166 OT EVAL MOD COMPLEX 45 MIN: CPT | Mod: GO

## 2025-08-15 PROCEDURE — 97150 GROUP THERAPEUTIC PROCEDURES: CPT | Mod: GP

## 2025-08-15 PROCEDURE — 250N000013 HC RX MED GY IP 250 OP 250 PS 637: Performed by: ORTHOPAEDIC SURGERY

## 2025-08-15 PROCEDURE — 97535 SELF CARE MNGMENT TRAINING: CPT | Mod: GO

## 2025-08-15 PROCEDURE — 99214 OFFICE O/P EST MOD 30 MIN: CPT | Performed by: INTERNAL MEDICINE

## 2025-08-15 RX ORDER — AMOXICILLIN 250 MG
1 CAPSULE ORAL 2 TIMES DAILY
Status: SHIPPED
Start: 2025-08-15 | End: 2025-08-20

## 2025-08-15 RX ORDER — OXYCODONE HYDROCHLORIDE 5 MG/1
5 TABLET ORAL EVERY 4 HOURS PRN
Status: SHIPPED
Start: 2025-08-15

## 2025-08-15 RX ORDER — ENOXAPARIN SODIUM 100 MG/ML
40 INJECTION SUBCUTANEOUS EVERY 24 HOURS
Status: SHIPPED
Start: 2025-08-15 | End: 2025-08-29

## 2025-08-15 RX ADMIN — OXYCODONE HYDROCHLORIDE 10 MG: 5 TABLET ORAL at 08:00

## 2025-08-15 RX ADMIN — DOCUSATE SODIUM AND SENNOSIDES 1 TABLET: 8.6; 5 TABLET ORAL at 08:00

## 2025-08-15 RX ADMIN — CEFAZOLIN 1 G: 1 INJECTION, POWDER, FOR SOLUTION INTRAMUSCULAR; INTRAVENOUS at 01:28

## 2025-08-15 RX ADMIN — ACETAMINOPHEN 975 MG: 325 TABLET ORAL at 04:34

## 2025-08-15 ASSESSMENT — ACTIVITIES OF DAILY LIVING (ADL)
ADLS_ACUITY_SCORE: 27
ADLS_ACUITY_SCORE: 23
ADLS_ACUITY_SCORE: 23
ADLS_ACUITY_SCORE: 27
ADLS_ACUITY_SCORE: 27
ADLS_ACUITY_SCORE: 23
ADLS_ACUITY_SCORE: 27
ADLS_ACUITY_SCORE: 27
ADLS_ACUITY_SCORE: 23
ADLS_ACUITY_SCORE: 27

## 2025-08-18 ENCOUNTER — CARE COORDINATION (OUTPATIENT)
Dept: FAMILY MEDICINE | Facility: CLINIC | Age: 80
End: 2025-08-18

## 2025-08-20 ENCOUNTER — OFFICE VISIT (OUTPATIENT)
Dept: FAMILY MEDICINE | Facility: CLINIC | Age: 80
End: 2025-08-20

## 2025-08-20 VITALS
OXYGEN SATURATION: 98 % | HEART RATE: 97 BPM | DIASTOLIC BLOOD PRESSURE: 64 MMHG | TEMPERATURE: 98.1 F | SYSTOLIC BLOOD PRESSURE: 116 MMHG

## 2025-08-20 DIAGNOSIS — Z96.649 STATUS POST TOTAL REPLACEMENT OF HIP, UNSPECIFIED LATERALITY: Primary | ICD-10-CM

## 2025-08-20 LAB
% GRANULOCYTES: 70.1 %
BUN SERPL-MCNC: 9 MG/DL (ref 7–25)
BUN/CREATININE RATIO: 8 (ref 6–32)
CALCIUM SERPL-MCNC: 9.4 MG/DL (ref 8.6–10.3)
CHLORIDE SERPLBLD-SCNC: 100.7 MMOL/L (ref 98–110)
CO2 SERPL-SCNC: 26.7 MMOL/L (ref 20–32)
CREAT SERPL-MCNC: 1.08 MG/DL (ref 0.6–1.3)
GLUCOSE SERPL-MCNC: 154 MG/DL (ref 60–99)
HCT VFR BLD AUTO: 31.9 % (ref 35–47)
HEMOGLOBIN: 10.7 G/DL (ref 11.7–15.7)
LYMPHOCYTES NFR BLD AUTO: 23.2 %
MCH RBC QN AUTO: 32.5 PG (ref 26–33)
MCHC RBC AUTO-ENTMCNC: 33.5 G/DL (ref 31–36)
MCV RBC AUTO: 97 FL (ref 78–100)
MONOCYTES NFR BLD AUTO: 6.7 %
PLATELET COUNT - QUEST: 326 10^9/L (ref 150–375)
POTASSIUM SERPL-SCNC: 3.26 MMOL/L (ref 3.5–5.3)
RBC # BLD AUTO: 3.29 10*12/L (ref 3.8–5.2)
SODIUM SERPL-SCNC: 135.9 MMOL/L (ref 135–146)
WBC # BLD AUTO: 9.1 10*9/L (ref 4–11)

## 2025-08-20 PROCEDURE — 80048 BASIC METABOLIC PNL TOTAL CA: CPT | Performed by: FAMILY MEDICINE

## 2025-08-20 PROCEDURE — 36415 COLL VENOUS BLD VENIPUNCTURE: CPT | Performed by: FAMILY MEDICINE

## 2025-08-20 PROCEDURE — 85025 COMPLETE CBC W/AUTO DIFF WBC: CPT | Performed by: FAMILY MEDICINE

## 2025-08-20 PROCEDURE — 99496 TRANSJ CARE MGMT HIGH F2F 7D: CPT | Performed by: FAMILY MEDICINE

## 2025-08-27 ENCOUNTER — TRANSFERRED RECORDS (OUTPATIENT)
Dept: FAMILY MEDICINE | Facility: CLINIC | Age: 80
End: 2025-08-27

## (undated) DEVICE — DRILL BIT FLEXIBLE REFLECTION 35MM 71362935

## (undated) DEVICE — GLOVE UNDER INDICATOR PI SZ 8.5 LF 41685

## (undated) DEVICE — CUSTOM PACK TOTAL HIP SOP5BTHHEA

## (undated) DEVICE — FILTER HEPA FLUID TRAP NEPTUNE 0703-040-001

## (undated) DEVICE — SU DERMABOND ADVANCED .7ML DNX12

## (undated) DEVICE — SU ETHIBOND 5 V-37 4X30" MB66G

## (undated) DEVICE — SOLUTION WATER 1000ML BOTTLE R5000-01

## (undated) DEVICE — SU STRATAFIX PDS PLUS 1 CT-1 18" SXPP1A404

## (undated) DEVICE — ELECTRODE PATIENT RETURN ADULT L10 FT 2 PLATE CORD 0855C

## (undated) DEVICE — Device

## (undated) DEVICE — SUCTION MANIFOLD NEPTUNE 2 SYS 1 PORT 702-025-000

## (undated) DEVICE — POSITIONER ABDUCTION PILLOW FOAM MED APG 202

## (undated) DEVICE — HOLDER LIMB VELCRO OR 0814-1533

## (undated) DEVICE — HOOD SURG T7 PLUS STRL LF DISP 0416-801-200

## (undated) DEVICE — SOL ISOPROPYL RUBBING ALCOHOL USP 70% 4OZ HDX-20 I0020

## (undated) DEVICE — SOLUTION IRRIGATION 0.9% NACL 1000ML BOTTLE R5200-01

## (undated) DEVICE — BLADE SAW SAGITTAL STRK WIDE 25.4X85X1.2MM 2108-151-000

## (undated) DEVICE — SOLUTION IV 0.9% NACL 1000ML E8000

## (undated) DEVICE — SU ETHIBOND 0 CT-1 CR 8X18" CX21D

## (undated) DEVICE — GLOVE BIOGEL PI ORTHOPRO SZ 8.0 47680

## (undated) DEVICE — PULSE LAVAGE IRRIGATION SYSTEM 0210-114-100

## (undated) DEVICE — SUTURE VICRYL+ 1 27IN CT-1 UND VCP261H

## (undated) DEVICE — SUTURE VICRYL+ 2-0 27IN SH UND VCP417H

## (undated) DEVICE — DRSG AQUACEL AG HYDROFIBER  3.5X10" 422605

## (undated) DEVICE — DRAPE CONVERTORS U-DRAPE 60X72" 8476

## (undated) DEVICE — SU STRATAFIX MONOCRYL 3-0 SPIRAL PS-2 30CM SXMP1B106

## (undated) DEVICE — RETRIVER SUTURE LASSO HOFFEE BLUE 710000

## (undated) DEVICE — DRAPE IOBAN INCISE 36X23" 6651EZ

## (undated) DEVICE — ESU ELEC BLADE 6" COATED E1450-6

## (undated) RX ORDER — FENTANYL CITRATE-0.9 % NACL/PF 10 MCG/ML
PLASTIC BAG, INJECTION (ML) INTRAVENOUS
Status: DISPENSED
Start: 2025-08-14

## (undated) RX ORDER — DEXAMETHASONE SODIUM PHOSPHATE 10 MG/ML
INJECTION, EMULSION INTRAMUSCULAR; INTRAVENOUS
Status: DISPENSED
Start: 2025-08-14

## (undated) RX ORDER — ONDANSETRON 2 MG/ML
INJECTION INTRAMUSCULAR; INTRAVENOUS
Status: DISPENSED
Start: 2025-08-14

## (undated) RX ORDER — PHENYLEPHRINE HYDROCHLORIDE 10 MG/ML
INJECTION INTRAVENOUS
Status: DISPENSED
Start: 2025-08-14

## (undated) RX ORDER — BUPIVACAINE HYDROCHLORIDE 5 MG/ML
INJECTION, SOLUTION EPIDURAL; INTRACAUDAL; PERINEURAL
Status: DISPENSED
Start: 2025-08-14

## (undated) RX ORDER — LIDOCAINE HYDROCHLORIDE 10 MG/ML
INJECTION, SOLUTION EPIDURAL; INFILTRATION; INTRACAUDAL; PERINEURAL
Status: DISPENSED
Start: 2025-08-14

## (undated) RX ORDER — PROPOFOL 10 MG/ML
INJECTION, EMULSION INTRAVENOUS
Status: DISPENSED
Start: 2025-08-14

## (undated) RX ORDER — FENTANYL CITRATE 50 UG/ML
INJECTION, SOLUTION INTRAMUSCULAR; INTRAVENOUS
Status: DISPENSED
Start: 2025-08-14